# Patient Record
Sex: FEMALE | Race: WHITE | URBAN - METROPOLITAN AREA
[De-identification: names, ages, dates, MRNs, and addresses within clinical notes are randomized per-mention and may not be internally consistent; named-entity substitution may affect disease eponyms.]

---

## 2017-08-28 ENCOUNTER — EMERGENCY (EMERGENCY)
Facility: HOSPITAL | Age: 47
LOS: 0 days | Discharge: HOME | End: 2017-08-28

## 2017-08-28 DIAGNOSIS — Z90.710 ACQUIRED ABSENCE OF BOTH CERVIX AND UTERUS: ICD-10-CM

## 2017-08-28 DIAGNOSIS — Z79.899 OTHER LONG TERM (CURRENT) DRUG THERAPY: ICD-10-CM

## 2017-08-28 DIAGNOSIS — R10.11 RIGHT UPPER QUADRANT PAIN: ICD-10-CM

## 2017-08-28 DIAGNOSIS — R20.0 ANESTHESIA OF SKIN: ICD-10-CM

## 2017-08-28 DIAGNOSIS — R11.0 NAUSEA: ICD-10-CM

## 2017-08-28 DIAGNOSIS — Z87.891 PERSONAL HISTORY OF NICOTINE DEPENDENCE: ICD-10-CM

## 2017-10-22 ENCOUNTER — INPATIENT (INPATIENT)
Facility: HOSPITAL | Age: 47
LOS: 0 days | Discharge: HOME | End: 2017-10-23
Attending: INTERNAL MEDICINE

## 2017-10-22 DIAGNOSIS — R20.0 ANESTHESIA OF SKIN: ICD-10-CM

## 2017-10-27 DIAGNOSIS — F41.9 ANXIETY DISORDER, UNSPECIFIED: ICD-10-CM

## 2017-10-27 DIAGNOSIS — R20.0 ANESTHESIA OF SKIN: ICD-10-CM

## 2017-10-27 DIAGNOSIS — M50.220 OTHER CERVICAL DISC DISPLACEMENT, MID-CERVICAL REGION, UNSPECIFIED LEVEL: ICD-10-CM

## 2017-10-27 DIAGNOSIS — Z90.710 ACQUIRED ABSENCE OF BOTH CERVIX AND UTERUS: ICD-10-CM

## 2017-10-27 DIAGNOSIS — F17.200 NICOTINE DEPENDENCE, UNSPECIFIED, UNCOMPLICATED: ICD-10-CM

## 2020-07-13 ENCOUNTER — TRANSCRIPTION ENCOUNTER (OUTPATIENT)
Age: 50
End: 2020-07-13

## 2021-07-11 ENCOUNTER — EMERGENCY (EMERGENCY)
Facility: HOSPITAL | Age: 51
LOS: 0 days | Discharge: HOME | End: 2021-07-11
Attending: EMERGENCY MEDICINE | Admitting: EMERGENCY MEDICINE
Payer: MEDICAID

## 2021-07-11 VITALS
HEART RATE: 78 BPM | OXYGEN SATURATION: 100 % | RESPIRATION RATE: 18 BRPM | DIASTOLIC BLOOD PRESSURE: 88 MMHG | SYSTOLIC BLOOD PRESSURE: 142 MMHG

## 2021-07-11 VITALS
TEMPERATURE: 97 F | RESPIRATION RATE: 16 BRPM | HEART RATE: 87 BPM | WEIGHT: 136.03 LBS | OXYGEN SATURATION: 98 % | SYSTOLIC BLOOD PRESSURE: 153 MMHG | DIASTOLIC BLOOD PRESSURE: 99 MMHG

## 2021-07-11 DIAGNOSIS — R20.2 PARESTHESIA OF SKIN: ICD-10-CM

## 2021-07-11 DIAGNOSIS — M79.644 PAIN IN RIGHT FINGER(S): ICD-10-CM

## 2021-07-11 DIAGNOSIS — S60.940A UNSPECIFIED SUPERFICIAL INJURY OF RIGHT INDEX FINGER, INITIAL ENCOUNTER: ICD-10-CM
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
## 2021-07-11 DIAGNOSIS — M79.89 OTHER SPECIFIED SOFT TISSUE DISORDERS: ICD-10-CM

## 2021-07-11 PROCEDURE — 99284 EMERGENCY DEPT VISIT MOD MDM: CPT

## 2021-07-11 PROCEDURE — 73130 X-RAY EXAM OF HAND: CPT | Mod: 26,RT

## 2021-07-11 PROCEDURE — 73090 X-RAY EXAM OF FOREARM: CPT | Mod: 26,RT

## 2021-07-11 RX ORDER — IBUPROFEN 200 MG
600 TABLET ORAL ONCE
Refills: 0 | Status: COMPLETED | OUTPATIENT
Start: 2021-07-11 | End: 2021-07-11

## 2021-07-11 RX ADMIN — Medication 600 MILLIGRAM(S): at 11:07

## 2021-07-11 NOTE — ED PROVIDER NOTE - PATIENT PORTAL LINK FT
You can access the FollowMyHealth Patient Portal offered by [facility] by registering at the following website: [email or web address]. By joining [employer]’s FollowMyHealth portal, you will also be able to view your health information using other applications (apps) compatible with our system.

## 2021-07-11 NOTE — ED PROVIDER NOTE - NS ED ROS FT
Constitutional:  (-) fever, (-) chills, (-) lethargy  Eyes:  (-) eye pain (-) visual changes  ENMT: (-) nasal discharge, (-) sore throat. (-) neck pain or stiffness  Cardiac: (-) chest pain (-) palpitations  Respiratory:  (-) cough (-) respiratory distress.   GI:  (-) nausea (-) vomiting (-) diarrhea (-) abdominal pain.  :  (-) dysuria (-) frequency (-) burning.  MS:  (-) back pain (-) joint pain.  Neuro:  (-) headache (-) numbness (+) tingling, R finger (-) focal weakness  Skin:  (-) rash  Except as documented in the HPI,  all other systems are negative

## 2021-07-11 NOTE — ED PROVIDER NOTE - OBJECTIVE STATEMENT
52 yo F with no significant PMH presenting with finger injury. Patient jammed her R index finger on her own palm the night before causing mild discomfort. Patient says that since then, she has had progressively increased swelling in the finger. Endorses tingling to entire digit. Denies other injuries, headache, vision changes, dizziness, chest pain, shortness of breath, nausea, vomiting, diarrhea, dysuria.

## 2021-07-11 NOTE — ED PROVIDER NOTE - CLINICAL SUMMARY MEDICAL DECISION MAKING FREE TEXT BOX
patient presents for evaluation of right index finger pain and swelling after hitting her other hand in a forceful manner while talking. she notes the pain as throbbing in nature and swelling but no redness signs of infection, she has swelling but no redness no pain with passive extension finger is not held in a flexed position in addition no redness noted we obtained xrays and placed patient in a finger splint in extension advised to follow up with hand in am. in addition radial pulses 2 += cap refill is normal at this time patient presents for evaluation of right index finger pain and swelling after hitting her other hand in a forceful manner while talking. she notes the pain as throbbing in nature and swelling but no redness signs of infection, she has swelling but no redness no pain with passive extension finger is not held in a flexed position in addition no redness noted we obtained xrays and placed patient in a finger splint in extension advised to follow up with hand in am. in addition radial pulses 2 += cap refill is normal at this time.

## 2021-07-11 NOTE — ED PROVIDER NOTE - NSFOLLOWUPINSTRUCTIONS_ED_ALL_ED_FT
Jammed Finger    A jammed finger is an injury to the ligaments that support your finger bones. Ligaments are strong bands of tissue that connect bones and keep them in place. This injury happens when the ligaments are stretched beyond their normal range of motion (sprained).    CAUSES  A jammed finger is caused by a hard direct hit to the tip of your finger that pushes your finger toward your hand.     RISK FACTORS  This injury is more likely to happen if you play sports.    SYMPTOMS  Symptoms of a jammed finger include:    Pain.  Swelling.  Discoloration and bruising around the joint.  Difficulty bending or straightening the finger.  Not being able to use the finger normally.    DIAGNOSIS  A jammed finger is diagnosed with a medical history and physical exam. You may also have X-rays taken to check for a broken bone (fracture).     TREATMENT  Treatment for a jammed finger may include:    Wearing a splint.  Taping the injured finger to the fingers beside it (chelita taping).   Medicines used to treat pain.    Depending on the type of injury, you may have to do exercises after your finger has begun to heal. This helps you regain strength and mobility in the finger.     HOME CARE INSTRUCTIONS  Take medicines only as directed by your health care provider.   Apply ice to the injured area:    Put ice in a plastic bag.    Place a towel between your skin and the bag.    Leave the ice on for 20 minutes, 2–3 times per day.   Raise the injured area above the level of your heart while you are sitting or lying down.  Wear the splint or tape as directed by your health care provider. Remove it only as directed by your health care provider.   Rest your finger until your health care provider says you can move it again. Your finger may feel stiff and painful for a while.  Perform strengthening exercises as directed by your health care provider. It may help to start doing these exercises with your hand in a bowl of warm water.  Keep all follow-up visits as directed by your health care provider. This is important.    SEEK MEDICAL CARE IF:  You have pain or swelling that is getting worse.  Your finger feels cold.  Your finger looks out of place at the joint (deformity).  You still cannot extend your finger after treatment.  You have a fever.    SEEK IMMEDIATE MEDICAL CARE IF:  Even after loosening your splint, your finger:  Is very red and swollen.  Is white or blue.  Feels tingly or becomes numb.    ADDITIONAL NOTES AND INSTRUCTIONS    Please follow up with your Primary MD in 24-48 hr.  Seek immediate medical care for any new/worsening signs or symptoms.

## 2021-07-11 NOTE — ED PROVIDER NOTE - PHYSICAL EXAMINATION
CONSTITUTIONAL: well-appearing, in NAD  SKIN: Warm dry, normal skin turgor  HEAD: NCAT  EYES: EOMI, PERRLA, no scleral icterus, conjunctiva pink  ENT: normal pharynx with no erythema or exudates  NECK: Supple; non tender. Full ROM.  CARD: RRR, no murmurs.  RESP: clear to ausculation b/l. No crackles or wheezing.  ABD: soft, non-tender, non-distended, no rebound or guarding.  EXT: R index finger grossly swollen, sensation in tact, cap refill <2 seconds, decreased active ROM 2/2 swelling and pain, full passive ROM  Full ROM, no bony tenderness, no pedal edema, no calf tenderness  NEURO: normal motor. normal sensory. Normal gait.  PSYCH: Cooperative, appropriate.

## 2021-07-11 NOTE — ED PROVIDER NOTE - ATTENDING CONTRIBUTION TO CARE
I was present for and supervised the key and critical aspects of the procedures performed during the care of the patient. patient presents for evaluation of right index finger pain and swelling after hitting her other hand in a forceful manner while talking. she notes the pain as throbbing in nature and swelling but no redness signs of infection, she has swelling but no redness no pain with passive extension finger is not held in a flexed position in addition no redness noted we obtained xrays and placed patient in a finger splint in extension advised to follow up with hand in am. in addition radial pulses 2 += cap refill is normal at this time

## 2021-07-11 NOTE — ED PROVIDER NOTE - CARE PROVIDER_API CALL
Isac Simpson)  Orthopaedic Surgery  [address] [place] [address]  Phone: [phone]  Fax: [phone]  Follow Up Time: 1-3 Days

## 2021-11-01 ENCOUNTER — INPATIENT (INPATIENT)
Facility: HOSPITAL | Age: 51
LOS: 2 days | Discharge: HOME | End: 2021-11-04
Attending: INTERNAL MEDICINE | Admitting: INTERNAL MEDICINE
Payer: MEDICAID

## 2021-11-01 VITALS
HEART RATE: 84 BPM | OXYGEN SATURATION: 97 % | DIASTOLIC BLOOD PRESSURE: 105 MMHG | RESPIRATION RATE: 19 BRPM | TEMPERATURE: 98 F | WEIGHT: 134.92 LBS | SYSTOLIC BLOOD PRESSURE: 206 MMHG

## 2021-11-01 LAB
ALBUMIN SERPL ELPH-MCNC: 4 G/DL — SIGNIFICANT CHANGE UP (ref 3.5–5.2)
ALBUMIN SERPL ELPH-MCNC: 4.2 G/DL — SIGNIFICANT CHANGE UP (ref 3.5–5.2)
ALP SERPL-CCNC: 74 U/L — SIGNIFICANT CHANGE UP (ref 30–115)
ALP SERPL-CCNC: 79 U/L — SIGNIFICANT CHANGE UP (ref 30–115)
ALT FLD-CCNC: 15 U/L — SIGNIFICANT CHANGE UP (ref 0–41)
ALT FLD-CCNC: 19 U/L — SIGNIFICANT CHANGE UP (ref 0–41)
ANION GAP SERPL CALC-SCNC: 12 MMOL/L — SIGNIFICANT CHANGE UP (ref 7–14)
ANION GAP SERPL CALC-SCNC: 9 MMOL/L — SIGNIFICANT CHANGE UP (ref 7–14)
AST SERPL-CCNC: 13 U/L — SIGNIFICANT CHANGE UP (ref 0–41)
AST SERPL-CCNC: 32 U/L — SIGNIFICANT CHANGE UP (ref 0–41)
BASOPHILS # BLD AUTO: 0.03 K/UL — SIGNIFICANT CHANGE UP (ref 0–0.2)
BASOPHILS NFR BLD AUTO: 0.3 % — SIGNIFICANT CHANGE UP (ref 0–1)
BILIRUB SERPL-MCNC: 0.3 MG/DL — SIGNIFICANT CHANGE UP (ref 0.2–1.2)
BILIRUB SERPL-MCNC: <0.2 MG/DL — SIGNIFICANT CHANGE UP (ref 0.2–1.2)
BUN SERPL-MCNC: 10 MG/DL — SIGNIFICANT CHANGE UP (ref 10–20)
BUN SERPL-MCNC: 11 MG/DL — SIGNIFICANT CHANGE UP (ref 10–20)
CALCIUM SERPL-MCNC: 8.9 MG/DL — SIGNIFICANT CHANGE UP (ref 8.5–10.1)
CALCIUM SERPL-MCNC: 9.3 MG/DL — SIGNIFICANT CHANGE UP (ref 8.5–10.1)
CHLORIDE SERPL-SCNC: 106 MMOL/L — SIGNIFICANT CHANGE UP (ref 98–110)
CHLORIDE SERPL-SCNC: 98 MMOL/L — SIGNIFICANT CHANGE UP (ref 98–110)
CO2 SERPL-SCNC: 20 MMOL/L — SIGNIFICANT CHANGE UP (ref 17–32)
CO2 SERPL-SCNC: 23 MMOL/L — SIGNIFICANT CHANGE UP (ref 17–32)
CREAT SERPL-MCNC: 0.7 MG/DL — SIGNIFICANT CHANGE UP (ref 0.7–1.5)
CREAT SERPL-MCNC: 0.8 MG/DL — SIGNIFICANT CHANGE UP (ref 0.7–1.5)
EOSINOPHIL # BLD AUTO: 0.16 K/UL — SIGNIFICANT CHANGE UP (ref 0–0.7)
EOSINOPHIL NFR BLD AUTO: 1.7 % — SIGNIFICANT CHANGE UP (ref 0–8)
GLUCOSE SERPL-MCNC: 92 MG/DL — SIGNIFICANT CHANGE UP (ref 70–99)
GLUCOSE SERPL-MCNC: 93 MG/DL — SIGNIFICANT CHANGE UP (ref 70–99)
HCT VFR BLD CALC: 43.3 % — SIGNIFICANT CHANGE UP (ref 37–47)
HGB BLD-MCNC: 14.3 G/DL — SIGNIFICANT CHANGE UP (ref 12–16)
IMM GRANULOCYTES NFR BLD AUTO: 0.3 % — SIGNIFICANT CHANGE UP (ref 0.1–0.3)
LACTATE SERPL-SCNC: 0.7 MMOL/L — SIGNIFICANT CHANGE UP (ref 0.7–2)
LYMPHOCYTES # BLD AUTO: 4.34 K/UL — HIGH (ref 1.2–3.4)
LYMPHOCYTES # BLD AUTO: 45.5 % — SIGNIFICANT CHANGE UP (ref 20.5–51.1)
MAGNESIUM SERPL-MCNC: 2.2 MG/DL — SIGNIFICANT CHANGE UP (ref 1.8–2.4)
MCHC RBC-ENTMCNC: 32.6 PG — HIGH (ref 27–31)
MCHC RBC-ENTMCNC: 33 G/DL — SIGNIFICANT CHANGE UP (ref 32–37)
MCV RBC AUTO: 98.6 FL — SIGNIFICANT CHANGE UP (ref 81–99)
MONOCYTES # BLD AUTO: 0.81 K/UL — HIGH (ref 0.1–0.6)
MONOCYTES NFR BLD AUTO: 8.5 % — SIGNIFICANT CHANGE UP (ref 1.7–9.3)
NEUTROPHILS # BLD AUTO: 4.17 K/UL — SIGNIFICANT CHANGE UP (ref 1.4–6.5)
NEUTROPHILS NFR BLD AUTO: 43.7 % — SIGNIFICANT CHANGE UP (ref 42.2–75.2)
NEUTS BAND # BLD: 1 % — SIGNIFICANT CHANGE UP (ref 0–6)
NRBC # BLD: 0 /100 WBCS — SIGNIFICANT CHANGE UP (ref 0–0)
NRBC # BLD: 0 /100 — SIGNIFICANT CHANGE UP (ref 0–0)
PLAT MORPH BLD: NORMAL — SIGNIFICANT CHANGE UP
PLATELET # BLD AUTO: 318 K/UL — SIGNIFICANT CHANGE UP (ref 130–400)
POTASSIUM SERPL-MCNC: 4.1 MMOL/L — SIGNIFICANT CHANGE UP (ref 3.5–5)
POTASSIUM SERPL-MCNC: 6.4 MMOL/L — CRITICAL HIGH (ref 3.5–5)
POTASSIUM SERPL-SCNC: 4.1 MMOL/L — SIGNIFICANT CHANGE UP (ref 3.5–5)
POTASSIUM SERPL-SCNC: 6.4 MMOL/L — CRITICAL HIGH (ref 3.5–5)
PROT SERPL-MCNC: 6 G/DL — SIGNIFICANT CHANGE UP (ref 6–8)
PROT SERPL-MCNC: 6.9 G/DL — SIGNIFICANT CHANGE UP (ref 6–8)
RBC # BLD: 4.39 M/UL — SIGNIFICANT CHANGE UP (ref 4.2–5.4)
RBC # FLD: 12.6 % — SIGNIFICANT CHANGE UP (ref 11.5–14.5)
RBC BLD AUTO: NORMAL — SIGNIFICANT CHANGE UP
SARS-COV-2 RNA SPEC QL NAA+PROBE: SIGNIFICANT CHANGE UP
SODIUM SERPL-SCNC: 130 MMOL/L — LOW (ref 135–146)
SODIUM SERPL-SCNC: 138 MMOL/L — SIGNIFICANT CHANGE UP (ref 135–146)
TROPONIN T SERPL-MCNC: <0.01 NG/ML — SIGNIFICANT CHANGE UP
VARIANT LYMPHS # BLD: 7 % — HIGH (ref 0–5)
WBC # BLD: 9.54 K/UL — SIGNIFICANT CHANGE UP (ref 4.8–10.8)
WBC # FLD AUTO: 9.54 K/UL — SIGNIFICANT CHANGE UP (ref 4.8–10.8)

## 2021-11-01 PROCEDURE — 93010 ELECTROCARDIOGRAM REPORT: CPT

## 2021-11-01 PROCEDURE — 99221 1ST HOSP IP/OBS SF/LOW 40: CPT

## 2021-11-01 PROCEDURE — 71045 X-RAY EXAM CHEST 1 VIEW: CPT | Mod: 26

## 2021-11-01 PROCEDURE — 99285 EMERGENCY DEPT VISIT HI MDM: CPT

## 2021-11-01 PROCEDURE — 70498 CT ANGIOGRAPHY NECK: CPT | Mod: 26,MA

## 2021-11-01 PROCEDURE — 70496 CT ANGIOGRAPHY HEAD: CPT | Mod: 26,MA

## 2021-11-01 PROCEDURE — 93970 EXTREMITY STUDY: CPT | Mod: 26

## 2021-11-01 RX ORDER — ATORVASTATIN CALCIUM 80 MG/1
80 TABLET, FILM COATED ORAL ONCE
Refills: 0 | Status: COMPLETED | OUTPATIENT
Start: 2021-11-01 | End: 2021-11-01

## 2021-11-01 RX ORDER — ASPIRIN/CALCIUM CARB/MAGNESIUM 324 MG
325 TABLET ORAL ONCE
Refills: 0 | Status: COMPLETED | OUTPATIENT
Start: 2021-11-01 | End: 2021-11-01

## 2021-11-01 RX ORDER — ALPRAZOLAM 0.25 MG
2 TABLET ORAL AT BEDTIME
Refills: 0 | Status: DISCONTINUED | OUTPATIENT
Start: 2021-11-01 | End: 2021-11-04

## 2021-11-01 RX ADMIN — Medication 325 MILLIGRAM(S): at 22:57

## 2021-11-01 RX ADMIN — ATORVASTATIN CALCIUM 80 MILLIGRAM(S): 80 TABLET, FILM COATED ORAL at 22:58

## 2021-11-01 NOTE — ED ADULT NURSE NOTE - CHIEF COMPLAINT QUOTE
pt states " I had numbness from my left arm to my leg that started last night. I went to [facility] and they told me to come to ED to R/O blood clot " MD at bedside

## 2021-11-01 NOTE — H&P ADULT - ASSESSMENT
51 YEAR OLD FEMALE ADMIT TO THE MEDICAL FLOOR FOR PARESTHESIA .  PLAN PARESTHESIA START LIPITOR 80MG DAILY AND ASPIRIN 325 MG DAILY AS  PER DR ADÁN BEATTY REQUEST             NEUROLOGY CONSULT LISETTE MCCAIN MD             NEURO CHECK Q SHIFT            ANXIETY CONTINUE XANAX PO PRN

## 2021-11-01 NOTE — H&P ADULT - NSHPLABSRESULTS_GEN_ALL_CORE
14.3   9.54  )-----------( 318      ( 01 Nov 2021 20:24 )             43.3       11-01    130<L>  |  98  |  10  ----------------------------<  92  4.1   |  20  |  0.7    Ca    8.9      01 Nov 2021 21:20  Mg     2.2     11-01    TPro  6.0  /  Alb  4.0  /  TBili  0.3  /  DBili  x   /  AST  13  /  ALT  15  /  AlkPhos  79  11-01                      Lactate Trend  11-01 @ 20:24 Lactate:0.7       CARDIAC MARKERS ( 01 Nov 2021 20:24 )  x     / <0.01 ng/mL / x     / x     / x            CAPILLARY BLOOD GLUCOSE 14.3   9.54  )-----------( 318      ( 01 Nov 2021 20:24 )             43.3       11-01    130<L>  |  98  |  10  ----------------------------<  92  4.1   |  20  |  0.7    Ca    8.9      01 Nov 2021 21:20  Mg     2.2     11-01    TPro  6.0  /  Alb  4.0  /  TBili  0.3  /  DBili  x   /  AST  13  /  ALT  15  /  AlkPhos  79  11-01        C< from: CT Angio Head w/ IV Cont (11.01.21 @ 21:52) >      IMPRESSION:    Negative CTA of the head and neck  No evidence of major vascular stenosis, occlusion, or aneurysm.    < from: CT Head No Cont (11.01.21 @ 21:49) >    IMPRESSION:    No CT evidence for acute intracranialpathology.    < end of copied text >            Lactate Trend  11-01 @ 20:24 Lactate:0.7       CARDIAC MARKERS ( 01 Nov 2021 20:24 )  x     / <0.01 ng/mL / x     / x     / x            CAPILLARY BLOOD GLUCOSE

## 2021-11-01 NOTE — ED ADULT TRIAGE NOTE - CHIEF COMPLAINT QUOTE
pt states " I had numbness from my left arm to my leg that started last night. I went to [facility] and they told me to come to ED to R/O blood clot " MD at bedside

## 2021-11-01 NOTE — H&P ADULT - ATTENDING COMMENTS
Case reviewed in detail with medical PA Patient seen at bedside though resting on my arrival. Case reviewed in detail with medical PA. Agree with assessment and plan (as discussed between ER staff and on call neurologist)

## 2021-11-01 NOTE — ED PROVIDER NOTE - PHYSICAL EXAMINATION
Physical Exam    Vital Signs: I have reviewed the initial vital signs.  Constitutional: well-nourished, appears stated age, no acute distress  Eyes: Conjunctiva pink, Sclera clear, PERRLA, EOMI.  Cardiovascular: S1 and S2, regular rate, regular rhythm, well-perfused extremities, radial pulses equal and 2+  Respiratory: unlabored respiratory effort, clear to auscultation bilaterally no wheezing, rales and rhonchi  Gastrointestinal: soft, non-tender abdomen, no pulsatile mass, normal bowl sounds  Musculoskeletal: supple neck, no lower extremity edema, no midline tenderness  Integumentary: warm, dry, no rash  Neurologic: awake, alert, cranial nerves II-XII grossly intact, extremities’ motor functions grossly intact, left sided arm and leg decrease sensation. cerebellar intact.   Psychiatric: appropriate mood, appropriate affect

## 2021-11-01 NOTE — ED PROVIDER NOTE - NSICDXFAMILYHX_GEN_ALL_CORE_FT
FAMILY HISTORY:  FH: kidney cancer    Mother  Still living? No  Family history of TIAs, Age at diagnosis: Age Unknown  FH: HTN (hypertension), Age at diagnosis: Age Unknown

## 2021-11-01 NOTE — ED PROVIDER NOTE - OBJECTIVE STATEMENT
51 year old female no sig past medical history comes to emergency room for left sided numbness that started yesterday around 7pm. patient states that she was eating dinner and started to have left sided numbness which has been constant but got worse tonight at 5pm. patient states that she took her bp was elevated and came to emergency room. patient denies chest pain, shortness of breath, abd pain, nausea, vomiting, diarrhea.

## 2021-11-01 NOTE — H&P ADULT - NSICDXNOPASTMEDICALHX_GEN_ALL_CORE
06/10/17 1600   Group 4   Start Time 1415   Stop Time 1515   Length (min) 60 min   Group Name Task Skills   Focus of Group social/leisure skills   Attendance Not present      <-- Click to add NO pertinent Past Medical History

## 2021-11-01 NOTE — ED PROVIDER NOTE - CLINICAL SUMMARY MEDICAL DECISION MAKING FREE TEXT BOX
51yF no pmhx no meds follows with her  PMD dr zamora  -  pw  tingling  and  numbness to left  side  arm and leg  started about 25.5 hours ago  -  admits a round 7 pm  last night  after  dinner -  symptoms  remained there until  tonight and  felt they got worse  around 5pm -  never resolved -   took her BP at t hat  time and it was elevated - went to [facility] and  sent to ED.   no  slurred speech headache  no difficulty ambulating  or holding things,    Alert nontoxic, perrl eomi, no pallor   CVS RRR, Resp CTA no tachypnea no hyperpnea,  No le edema, no skin lesions  Alert and oriented.  CN 2-12 intact.  Motor strength  symmetric 5/5 strength,  decreased sensation  to LUE.   CB intact. no dysmetria  normal gait.   CT head  and CTA  no acute findings,   dw neuro -  admitted to floor  given  length of time of  symptoms,  recommended asa and  statin -  given in ED     pt admitted

## 2021-11-02 LAB
A1C WITH ESTIMATED AVERAGE GLUCOSE RESULT: 5.3 % — SIGNIFICANT CHANGE UP (ref 4–5.6)
ANION GAP SERPL CALC-SCNC: 7 MMOL/L — SIGNIFICANT CHANGE UP (ref 7–14)
BUN SERPL-MCNC: 8 MG/DL — LOW (ref 10–20)
CALCIUM SERPL-MCNC: 9.1 MG/DL — SIGNIFICANT CHANGE UP (ref 8.5–10.1)
CHLORIDE SERPL-SCNC: 107 MMOL/L — SIGNIFICANT CHANGE UP (ref 98–110)
CHOLEST SERPL-MCNC: 221 MG/DL — HIGH
CO2 SERPL-SCNC: 27 MMOL/L — SIGNIFICANT CHANGE UP (ref 17–32)
CREAT SERPL-MCNC: 0.8 MG/DL — SIGNIFICANT CHANGE UP (ref 0.7–1.5)
ESTIMATED AVERAGE GLUCOSE: 105 MG/DL — SIGNIFICANT CHANGE UP (ref 68–114)
GLUCOSE SERPL-MCNC: 83 MG/DL — SIGNIFICANT CHANGE UP (ref 70–99)
HCT VFR BLD CALC: 40.8 % — SIGNIFICANT CHANGE UP (ref 37–47)
HDLC SERPL-MCNC: 91 MG/DL — SIGNIFICANT CHANGE UP
HGB BLD-MCNC: 13.7 G/DL — SIGNIFICANT CHANGE UP (ref 12–16)
LIPID PNL WITH DIRECT LDL SERPL: 117 MG/DL — HIGH
MCHC RBC-ENTMCNC: 33 PG — HIGH (ref 27–31)
MCHC RBC-ENTMCNC: 33.6 G/DL — SIGNIFICANT CHANGE UP (ref 32–37)
MCV RBC AUTO: 98.3 FL — SIGNIFICANT CHANGE UP (ref 81–99)
NON HDL CHOLESTEROL: 130 MG/DL — HIGH
NRBC # BLD: 0 /100 WBCS — SIGNIFICANT CHANGE UP (ref 0–0)
PLATELET # BLD AUTO: 299 K/UL — SIGNIFICANT CHANGE UP (ref 130–400)
POTASSIUM SERPL-MCNC: 4.2 MMOL/L — SIGNIFICANT CHANGE UP (ref 3.5–5)
POTASSIUM SERPL-SCNC: 4.2 MMOL/L — SIGNIFICANT CHANGE UP (ref 3.5–5)
RBC # BLD: 4.15 M/UL — LOW (ref 4.2–5.4)
RBC # FLD: 12.9 % — SIGNIFICANT CHANGE UP (ref 11.5–14.5)
SODIUM SERPL-SCNC: 141 MMOL/L — SIGNIFICANT CHANGE UP (ref 135–146)
TRIGL SERPL-MCNC: 71 MG/DL — SIGNIFICANT CHANGE UP
TSH SERPL-MCNC: 1.28 UIU/ML — SIGNIFICANT CHANGE UP (ref 0.27–4.2)
WBC # BLD: 8.01 K/UL — SIGNIFICANT CHANGE UP (ref 4.8–10.8)
WBC # FLD AUTO: 8.01 K/UL — SIGNIFICANT CHANGE UP (ref 4.8–10.8)

## 2021-11-02 PROCEDURE — 99232 SBSQ HOSP IP/OBS MODERATE 35: CPT

## 2021-11-02 PROCEDURE — 93010 ELECTROCARDIOGRAM REPORT: CPT

## 2021-11-02 PROCEDURE — 99222 1ST HOSP IP/OBS MODERATE 55: CPT

## 2021-11-02 RX ORDER — CHLORHEXIDINE GLUCONATE 213 G/1000ML
1 SOLUTION TOPICAL DAILY
Refills: 0 | Status: DISCONTINUED | OUTPATIENT
Start: 2021-11-02 | End: 2021-11-04

## 2021-11-02 RX ORDER — ENOXAPARIN SODIUM 100 MG/ML
40 INJECTION SUBCUTANEOUS DAILY
Refills: 0 | Status: DISCONTINUED | OUTPATIENT
Start: 2021-11-02 | End: 2021-11-04

## 2021-11-02 RX ORDER — ATORVASTATIN CALCIUM 80 MG/1
80 TABLET, FILM COATED ORAL AT BEDTIME
Refills: 0 | Status: DISCONTINUED | OUTPATIENT
Start: 2021-11-02 | End: 2021-11-04

## 2021-11-02 RX ORDER — ASPIRIN/CALCIUM CARB/MAGNESIUM 324 MG
81 TABLET ORAL DAILY
Refills: 0 | Status: DISCONTINUED | OUTPATIENT
Start: 2021-11-02 | End: 2021-11-04

## 2021-11-02 RX ADMIN — CHLORHEXIDINE GLUCONATE 1 APPLICATION(S): 213 SOLUTION TOPICAL at 12:30

## 2021-11-02 RX ADMIN — ENOXAPARIN SODIUM 40 MILLIGRAM(S): 100 INJECTION SUBCUTANEOUS at 12:29

## 2021-11-02 RX ADMIN — Medication 2 MILLIGRAM(S): at 23:59

## 2021-11-02 RX ADMIN — Medication 2 MILLIGRAM(S): at 21:10

## 2021-11-02 NOTE — CONSULT NOTE ADULT - ATTENDING COMMENTS
Patient examined.  Smoking cessation discussed.  MRI to exclude acute stroke.  Antiplatelet and statin recommended.

## 2021-11-02 NOTE — CONSULT NOTE ADULT - SUBJECTIVE AND OBJECTIVE BOX
Neurology Consult  MARTA Jo [id number]    Patient is a 51y old  Female who presents with a chief complaint of 51 YEAR OLD FEMALE C/O NUMBNESS IN HER LEFT LE . (01 Nov 2021 23:26)      HPI:  51 YEAR OLD FEMALE WITH PMH OF ANXIETY COME TO THE ER C/O LEFT LOWER EXTREMITY START EARLY TONIGHT . PATIENT STATES THE THE  SYMPTOMS RESOLVED NOW . PATIENT STATES THAT SHE HAS PREVIOUS EPISODE LIKE THAT IN THE PAST AND HER BLOOD PRESSURE WAS HIGH THAT TIME AND RESOLVED ALSO . PATIENT DENIES ANY OTHER SYMPTOMS IN HER BODY .    (01 Nov 2021 23:26)      Interim HPI -  Pt seen at bedside with Dr Ladd, patient admits to above hpi, and states never had similar episode. Pt does admit to hx of anxiety and states she has no elevated anxiety over the past week and no recent concerns of anxiety. Pt does have family hx of TIA and stroke.  Pt denies any current visual complaints, denies h/a, denies migraines.  Pt admits not 100% back to baseline.  ++Smoker 1ppd hx     PAST MEDICAL & SURGICAL HISTORY:  No pertinent past medical history  ANXIETY        FAMILY HISTORY:  FH: kidney cancer    FH: HTN (hypertension) (Mother)    Family history of TIAs (Mother)        Allergies    No Known Allergies    Intolerances        MEDICATIONS  (STANDING):  aspirin enteric coated 81 milliGRAM(s) Oral daily  atorvastatin 80 milliGRAM(s) Oral at bedtime  chlorhexidine 4% Liquid 1 Application(s) Topical daily  enoxaparin Injectable 40 milliGRAM(s) SubCutaneous daily    MEDICATIONS  (PRN):  ALPRAZolam 2 milliGRAM(s) Oral at bedtime PRN ANXIETY      Review of systems:    Constitutional: No fever, weight loss or fatigue    Eyes: No eye pain or discharge  ENMT:  No difficulty hearing; No sinus or throat pain  Neck: No pain or stiffness  Respiratory: No cough, wheezing, chills or hemoptysis  Cardiovascular: No chest pain, palpitations, shortness of breath, dyspnea on exertion  Gastrointestinal: No abdominal pain, nausea, vomiting or hematemesis; No diarrhea or constipation.   Genitourinary: No dysuria, frequency, hematuria or incontinence  Neurological: As per HPI  Skin: No rashes or lesions   Endocrine: No heat or cold intolerance; No hair loss  Musculoskeletal: No joint pain or swelling  Psychiatric: No depression, anxiety, mood swings  Heme/Lymph: No easy bruising or bleeding gums    Vital Signs Last 24 Hrs  T(C): 36.1 (02 Nov 2021 14:09), Max: 36.7 (02 Nov 2021 09:33)  T(F): 96.9 (02 Nov 2021 14:09), Max: 98 (02 Nov 2021 09:33)  HR: 75 (02 Nov 2021 14:09) (66 - 84)  BP: 125/88 (02 Nov 2021 14:09) (116/78 - 206/105)  BP(mean): --  RR: 18 (02 Nov 2021 14:09) (18 - 19)  SpO2: 97% (02 Nov 2021 14:09) (96% - 97%)    Neurologic Examination:  General:  Appearance is consistent with chronologic age.  No abnormal facies.   General: The patient is oriented to person, place, time and date.  Recent and remote memory intact.  Fund of knowledge is intact and normal.  Language with normal repetition, comprehension and naming.  Nondysarthric.    Cranial nerves: intact VA, VFF.  EOMI w/o nystagmus, skew or reported double vision.  PERRL.  No ptosis/weakness of eyelid closure.  Facial sensation is normal with normal bite.  No facial asymmetry.  Hearing grossly intact b/l.  Palate elevates midline.  Tongue midline.  Motor examination:   Normal tone, bulk and range of motion.  No tenderness, twitching, tremors or involuntary movements.  Formal Muscle Strength Testing: (MRC grade R/L) 5/5 UE; 5/5 LE.  No observable drift.  Reflexes:   2+ b/l pectoralis, biceps, triceps, brachioradialis, patella and Achilles.  Plantar response downgoing b/l.  Jaw jerk, Jimmy, clonus absent.  Sensory examination:   Intact to light touch and pinprick, pain, temperature and proprioception and vibration in all extremities.  Cerebellum:   FTN/HKS intact with normal AGATHA in all limbs.  No dysmetria or dysdiadokinesia.  Gait narrow based and normal.  NIHSS- 0      Labs:   CBC Full  -  ( 02 Nov 2021 05:46 )  WBC Count : 8.01 K/uL  RBC Count : 4.15 M/uL  Hemoglobin : 13.7 g/dL  Hematocrit : 40.8 %  Platelet Count - Automated : 299 K/uL  Mean Cell Volume : 98.3 fL  Mean Cell Hemoglobin : 33.0 pg  Mean Cell Hemoglobin Concentration : 33.6 g/dL  Auto Neutrophil # : x      11-02    141  |  107  |  8<L>  ----------------------------<  83  4.2   |  27  |  0.8    Ca    9.1      02 Nov 2021 05:46  Mg     2.2     11-01    TPro  6.0  /  Alb  4.0  /  TBili  0.3  /  DBili  x   /  AST  13  /  ALT  15  /  AlkPhos  79  11-01    LIVER FUNCTIONS - ( 01 Nov 2021 21:20 )  Alb: 4.0 g/dL / Pro: 6.0 g/dL / ALK PHOS: 79 U/L / ALT: 15 U/L / AST: 13 U/L / GGT: x                   Neuroimaging:  NCHCT: CT Head No Cont:   EXAM:  CT BRAIN            PROCEDURE DATE:  11/01/2021            INTERPRETATION:  Clinical History / Reason for exam: Left-sided weakness    TECHNIQUE: Contiguous axial CT images were obtained from the base of the skull to the vertex without administration of intravenous contrast. Coronal and sagittal reformatted images were constructed.    COMPARISON:CT head 10/22/2017      FINDINGS:    The ventricles and cortical sulci are appropriate for the patient's stated age.    Gray-white matter differentiation is preserved.    There is no evidence for intracranial hemorrhage, mass effect or midline shift    The calvarium is intact. Visualized paranasal sinuses and mastoids are well-aerated.      IMPRESSION:    No CT evidence for acute intracranialpathology.    --- End of Report ---            MYRON GONZALEZ MD; Resident Radiologist  This document has been electronically signed.  SAMI CROWDER MD; Attending Interventional Radiologist  This document has been electronically signed. Nov 1 202110:05PM (11-01-21 @ 21:49)    CT Angiography/Perfusion:  MRI Brain NC:  MRA Head/Neck:  EEG:    Assessment:  This is a 51y Female with h/o anxiety, presents to hospital for 1 day episode of left sided numbness.  Anxiety exacerbation vs Stroke vs TIA    Plan:   - Telemetry monitoring  - CTH noted above no acute pathology  - MRI Brain no contrast needed  - Continue ASA 81mg daily  - Continue Atorvastatin 80mg daily while inpatient  - Neuro Checks q8h   - Stroke workup  - Lipid profile, HgA1C  - Smoking Cessation  - Neurology team will follow      11-02-21 @ 14:16

## 2021-11-03 LAB
ALBUMIN SERPL ELPH-MCNC: 3.8 G/DL — SIGNIFICANT CHANGE UP (ref 3.5–5.2)
ALP SERPL-CCNC: 71 U/L — SIGNIFICANT CHANGE UP (ref 30–115)
ALT FLD-CCNC: 13 U/L — SIGNIFICANT CHANGE UP (ref 0–41)
ANION GAP SERPL CALC-SCNC: 10 MMOL/L — SIGNIFICANT CHANGE UP (ref 7–14)
AST SERPL-CCNC: 13 U/L — SIGNIFICANT CHANGE UP (ref 0–41)
BILIRUB SERPL-MCNC: 0.3 MG/DL — SIGNIFICANT CHANGE UP (ref 0.2–1.2)
BUN SERPL-MCNC: 14 MG/DL — SIGNIFICANT CHANGE UP (ref 10–20)
CALCIUM SERPL-MCNC: 9.2 MG/DL — SIGNIFICANT CHANGE UP (ref 8.5–10.1)
CHLORIDE SERPL-SCNC: 106 MMOL/L — SIGNIFICANT CHANGE UP (ref 98–110)
CO2 SERPL-SCNC: 24 MMOL/L — SIGNIFICANT CHANGE UP (ref 17–32)
COVID-19 SPIKE DOMAIN AB INTERP: POSITIVE
COVID-19 SPIKE DOMAIN ANTIBODY RESULT: >250 U/ML — HIGH
CREAT SERPL-MCNC: 0.8 MG/DL — SIGNIFICANT CHANGE UP (ref 0.7–1.5)
GLUCOSE SERPL-MCNC: 84 MG/DL — SIGNIFICANT CHANGE UP (ref 70–99)
HCT VFR BLD CALC: 42.2 % — SIGNIFICANT CHANGE UP (ref 37–47)
HGB BLD-MCNC: 13.9 G/DL — SIGNIFICANT CHANGE UP (ref 12–16)
MCHC RBC-ENTMCNC: 32.9 G/DL — SIGNIFICANT CHANGE UP (ref 32–37)
MCHC RBC-ENTMCNC: 32.9 PG — HIGH (ref 27–31)
MCV RBC AUTO: 100 FL — HIGH (ref 81–99)
NRBC # BLD: 0 /100 WBCS — SIGNIFICANT CHANGE UP (ref 0–0)
PLATELET # BLD AUTO: 307 K/UL — SIGNIFICANT CHANGE UP (ref 130–400)
POTASSIUM SERPL-MCNC: 5.5 MMOL/L — HIGH (ref 3.5–5)
POTASSIUM SERPL-SCNC: 5.5 MMOL/L — HIGH (ref 3.5–5)
PROT SERPL-MCNC: 5.9 G/DL — LOW (ref 6–8)
RBC # BLD: 4.22 M/UL — SIGNIFICANT CHANGE UP (ref 4.2–5.4)
RBC # FLD: 12.7 % — SIGNIFICANT CHANGE UP (ref 11.5–14.5)
SARS-COV-2 IGG+IGM SERPL QL IA: >250 U/ML — HIGH
SARS-COV-2 IGG+IGM SERPL QL IA: POSITIVE
SODIUM SERPL-SCNC: 140 MMOL/L — SIGNIFICANT CHANGE UP (ref 135–146)
WBC # BLD: 7.02 K/UL — SIGNIFICANT CHANGE UP (ref 4.8–10.8)
WBC # FLD AUTO: 7.02 K/UL — SIGNIFICANT CHANGE UP (ref 4.8–10.8)

## 2021-11-03 PROCEDURE — 99232 SBSQ HOSP IP/OBS MODERATE 35: CPT

## 2021-11-03 RX ADMIN — ATORVASTATIN CALCIUM 80 MILLIGRAM(S): 80 TABLET, FILM COATED ORAL at 00:01

## 2021-11-03 RX ADMIN — Medication 81 MILLIGRAM(S): at 11:13

## 2021-11-03 RX ADMIN — Medication 2 MILLIGRAM(S): at 21:29

## 2021-11-03 RX ADMIN — ATORVASTATIN CALCIUM 80 MILLIGRAM(S): 80 TABLET, FILM COATED ORAL at 21:24

## 2021-11-03 RX ADMIN — Medication 1 MILLIGRAM(S): at 22:56

## 2021-11-03 RX ADMIN — ENOXAPARIN SODIUM 40 MILLIGRAM(S): 100 INJECTION SUBCUTANEOUS at 11:13

## 2021-11-04 ENCOUNTER — TRANSCRIPTION ENCOUNTER (OUTPATIENT)
Age: 51
End: 2021-11-04

## 2021-11-04 VITALS
HEART RATE: 62 BPM | TEMPERATURE: 97 F | DIASTOLIC BLOOD PRESSURE: 81 MMHG | RESPIRATION RATE: 16 BRPM | SYSTOLIC BLOOD PRESSURE: 110 MMHG

## 2021-11-04 PROCEDURE — 99232 SBSQ HOSP IP/OBS MODERATE 35: CPT

## 2021-11-04 PROCEDURE — 70553 MRI BRAIN STEM W/O & W/DYE: CPT | Mod: 26

## 2021-11-04 RX ORDER — ASPIRIN/CALCIUM CARB/MAGNESIUM 324 MG
81 TABLET ORAL DAILY
Refills: 0 | Status: DISCONTINUED | OUTPATIENT
Start: 2021-11-04 | End: 2021-11-04

## 2021-11-04 RX ORDER — ASPIRIN/CALCIUM CARB/MAGNESIUM 324 MG
1 TABLET ORAL
Qty: 30 | Refills: 0
Start: 2021-11-04

## 2021-11-04 RX ORDER — ROSUVASTATIN CALCIUM 5 MG/1
1 TABLET ORAL
Qty: 30 | Refills: 0
Start: 2021-11-04

## 2021-11-04 RX ADMIN — Medication 1 MILLIGRAM(S): at 11:40

## 2021-11-04 NOTE — PROGRESS NOTE ADULT - REASON FOR ADMISSION
51 YEAR OLD FEMALE C/O NUMBNESS IN HER LEFT LE .
LE paresthesia

## 2021-11-04 NOTE — DISCHARGE NOTE NURSING/CASE MANAGEMENT/SOCIAL WORK - PATIENT PORTAL LINK FT
You can access the FollowMyHealth Patient Portal offered by [facility] by registering at the following website: [email or web address]. By joining [employer]’s FollowMyHealth portal, you will also be able to view your health information using other applications (apps) compatible with our system.

## 2021-11-04 NOTE — DISCHARGE NOTE PROVIDER - NSDCMRMEDTOKEN_GEN_ALL_CORE_FT
Xanax 2 mg oral tablet: 2 milligram(s) orally once a day (at bedtime), As Needed   aspirin 81 mg oral delayed release tablet: 1 tab(s) orally once a day  Crestor 40 mg oral tablet: 1 tab(s) orally once a day (at bedtime)   Xanax 2 mg oral tablet: 2 milligram(s) orally once a day (at bedtime), As Needed

## 2021-11-04 NOTE — PROGRESS NOTE ADULT - SUBJECTIVE AND OBJECTIVE BOX
Patient is a 51y old  Female who presents with a chief complaint of 51 YEAR OLD FEMALE C/O NUMBNESS IN HER LEFT LE . (02 Nov 2021 10:45)      SUBJECTIVE / OVERNIGHT EVENTS: Pending MRI. Ativan prn prior to MRI. Afterwards, can go home pending MRI results.     ADDITIONAL REVIEW OF SYSTEMS:  Le weakness  no cp or sob  anxious      MEDICATIONS  (STANDING):  aspirin enteric coated 81 milliGRAM(s) Oral daily  atorvastatin 80 milliGRAM(s) Oral at bedtime  chlorhexidine 4% Liquid 1 Application(s) Topical daily  enoxaparin Injectable 40 milliGRAM(s) SubCutaneous daily    MEDICATIONS  (PRN):  ALPRAZolam 2 milliGRAM(s) Oral at bedtime PRN ANXIETY      CAPILLARY BLOOD GLUCOSE        I&O's Summary      PHYSICAL EXAM:  Vital Signs Last 24 Hrs  T(C): 36.1 (02 Nov 2021 14:09), Max: 36.7 (02 Nov 2021 09:33)  T(F): 96.9 (02 Nov 2021 14:09), Max: 98 (02 Nov 2021 09:33)  HR: 75 (02 Nov 2021 14:09) (66 - 84)  BP: 125/88 (02 Nov 2021 14:09) (116/78 - 206/105)  BP(mean): --  RR: 18 (02 Nov 2021 14:09) (18 - 19)  SpO2: 97% (02 Nov 2021 14:09) (96% - 97%)    CONSTITUTIONAL: NAD, well-developed, well-groomed  EYES: PERRLA; conjunctiva and sclera clear  ENMT: Moist oral mucosa, no pharyngeal injection or exudates; normal dentition  NECK: Supple, no palpable masses; no thyromegaly  RESPIRATORY: Normal respiratory effort; lungs are clear to auscultation bilaterally  CARDIOVASCULAR: Regular rate and rhythm, normal S1 and S2, no murmur/rub/gallop; No lower extremity edema; Peripheral pulses are 2+ bilaterally  ABDOMEN: Nontender to palpation, normoactive bowel sounds, no rebound/guarding; No hepatosplenomegaly  MUSCULOSKELETAL:  4/5 strength in Lt UE and LE; no facial drooping but decreased fullness on lt side of face, no aphasia  PSYCH: A+O to person, place, and time; affect appropriate  NEUROLOGY: CN 2-12 are intact and symmetric; no gross sensory deficits   SKIN: No rashes; no palpable lesions    LABS:                        13.7   8.01  )-----------( 299      ( 02 Nov 2021 05:46 )             40.8     11-02    141  |  107  |  8<L>  ----------------------------<  83  4.2   |  27  |  0.8    Ca    9.1      02 Nov 2021 05:46  Mg     2.2     11-01    TPro  6.0  /  Alb  4.0  /  TBili  0.3  /  DBili  x   /  AST  13  /  ALT  15  /  AlkPhos  79  11-01      CARDIAC MARKERS ( 01 Nov 2021 20:24 )  x     / <0.01 ng/mL / x     / x     / x                RADIOLOGY & ADDITIONAL TESTS:  Results Reviewed:   Imaging Personally Reviewed:  Electrocardiogram Personally Reviewed:    COORDINATION OF CARE:  Care Discussed with Consultants/Other Providers [Y/N]:  Prior or Outpatient Records Reviewed [Y/N]:  
Neurology Progress Note    Interval History:    Pt seen on 11/3/21 in f/u for left sided paresthesias.  States symptoms resolved.   at bedside.  Pt awaiting MRI.   Requests Ativan for study.      Vital Signs Last 24 Hrs  T(C): 35.9 (04 Nov 2021 05:00), Max: 36.4 (03 Nov 2021 13:09)  T(F): 96.6 (04 Nov 2021 05:00), Max: 97.5 (03 Nov 2021 13:09)  HR: 62 (04 Nov 2021 05:00) (62 - 71)  BP: 110/81 (04 Nov 2021 05:00) (110/81 - 137/84)  BP(mean): --  RR: 16 (04 Nov 2021 05:00) (16 - 16)  SpO2: --    Neurological Exam:   Mental status: Awake, alert and oriented x3.  Recent and remote memory intact.  Naming, repetition and comprehension intact.  Attention/concentration intact.  No dysarthria, no aphasia.  Fund of knowledge appropriate.    Cranial nerves: EOM's full, facial mm symmetric.  Motor:  MRC grading 5/5 b/l UE/LE.   strength 5/5.  Normal tone and bulk.  No abnormal movements.    Sensation: Intact to light touch, pain and double simultaneous stimulation.  Coordination: No dysmetria on finger-to-nose and heel-to-shin.  No dysdiadokinesia.    NIH score = 0    MEDICATIONS  (STANDING):  aspirin enteric coated 81 milliGRAM(s) Oral daily  atorvastatin 80 milliGRAM(s) Oral at bedtime  chlorhexidine 4% Liquid 1 Application(s) Topical daily  enoxaparin Injectable 40 milliGRAM(s) SubCutaneous daily        Labs:  CBC Full  -  ( 03 Nov 2021 06:09 )  WBC Count : 7.02 K/uL  RBC Count : 4.22 M/uL  Hemoglobin : 13.9 g/dL  Hematocrit : 42.2 %  Platelet Count - Automated : 307 K/uL  Mean Cell Volume : 100.0 fL  Mean Cell Hemoglobin : 32.9 pg  Mean Cell Hemoglobin Concentration : 32.9 g/dL  Auto Neutrophil # : x  Auto Lymphocyte # : x  Auto Monocyte # : x  Auto Eosinophil # : x  Auto Basophil # : x  Auto Neutrophil % : x  Auto Lymphocyte % : x  Auto Monocyte % : x  Auto Eosinophil % : x  Auto Basophil % : x    11-03    140  |  106  |  14  ----------------------------<  84  5.5<H>   |  24  |  0.8    Ca    9.2      03 Nov 2021 06:09    TPro  5.9<L>  /  Alb  3.8  /  TBili  0.3  /  DBili  x   /  AST  13  /  ALT  13  /  AlkPhos  71  11-03    LIVER FUNCTIONS - ( 03 Nov 2021 06:09 )  Alb: 3.8 g/dL / Pro: 5.9 g/dL / ALK PHOS: 71 U/L / ALT: 13 U/L / AST: 13 U/L / GGT: x             Assessment:  This is a 51y Female w/ h/o with episode of left sided paresthesias, now resolved. CTH negative.    Plan:  1.  MRI brain w/o.  If negative for acute stroke may be discharged.    2.  If MRI positive for stroke pt would need TTE.  3.  Stroke clinic f/u in 2 weeks.
      Patient is a 51y old  Female who presents with a chief complaint of 51 YEAR OLD FEMALE C/O NUMBNESS IN HER LEFT LE . (02 Nov 2021 10:45)      SUBJECTIVE / OVERNIGHT EVENTS: Still has 4/5 strength on her left side, no facial abnormalities but decreased fullness on left side. Mother has a h/o of TIAs. Pt states she does not have HTN and is reluctant to start meds. MRI head pending. Neuro recs appreciated. Had a TIA once before.     ADDITIONAL REVIEW OF SYSTEMS:  Le weakness  no cp or sob  anxious      MEDICATIONS  (STANDING):  aspirin enteric coated 81 milliGRAM(s) Oral daily  atorvastatin 80 milliGRAM(s) Oral at bedtime  chlorhexidine 4% Liquid 1 Application(s) Topical daily  enoxaparin Injectable 40 milliGRAM(s) SubCutaneous daily    MEDICATIONS  (PRN):  ALPRAZolam 2 milliGRAM(s) Oral at bedtime PRN ANXIETY      CAPILLARY BLOOD GLUCOSE        I&O's Summary      PHYSICAL EXAM:  Vital Signs Last 24 Hrs  T(C): 36.1 (02 Nov 2021 14:09), Max: 36.7 (02 Nov 2021 09:33)  T(F): 96.9 (02 Nov 2021 14:09), Max: 98 (02 Nov 2021 09:33)  HR: 75 (02 Nov 2021 14:09) (66 - 84)  BP: 125/88 (02 Nov 2021 14:09) (116/78 - 206/105)  BP(mean): --  RR: 18 (02 Nov 2021 14:09) (18 - 19)  SpO2: 97% (02 Nov 2021 14:09) (96% - 97%)    CONSTITUTIONAL: NAD, well-developed, well-groomed  EYES: PERRLA; conjunctiva and sclera clear  ENMT: Moist oral mucosa, no pharyngeal injection or exudates; normal dentition  NECK: Supple, no palpable masses; no thyromegaly  RESPIRATORY: Normal respiratory effort; lungs are clear to auscultation bilaterally  CARDIOVASCULAR: Regular rate and rhythm, normal S1 and S2, no murmur/rub/gallop; No lower extremity edema; Peripheral pulses are 2+ bilaterally  ABDOMEN: Nontender to palpation, normoactive bowel sounds, no rebound/guarding; No hepatosplenomegaly  MUSCULOSKELETAL:  4/5 strength in Lt UE and LE; no facial drooping but decreased fullness on lt side of face, no aphasia  PSYCH: A+O to person, place, and time; affect appropriate  NEUROLOGY: CN 2-12 are intact and symmetric; no gross sensory deficits   SKIN: No rashes; no palpable lesions    LABS:                        13.7   8.01  )-----------( 299      ( 02 Nov 2021 05:46 )             40.8     11-02    141  |  107  |  8<L>  ----------------------------<  83  4.2   |  27  |  0.8    Ca    9.1      02 Nov 2021 05:46  Mg     2.2     11-01    TPro  6.0  /  Alb  4.0  /  TBili  0.3  /  DBili  x   /  AST  13  /  ALT  15  /  AlkPhos  79  11-01      CARDIAC MARKERS ( 01 Nov 2021 20:24 )  x     / <0.01 ng/mL / x     / x     / x                RADIOLOGY & ADDITIONAL TESTS:  Results Reviewed:   Imaging Personally Reviewed:  Electrocardiogram Personally Reviewed:    COORDINATION OF CARE:  Care Discussed with Consultants/Other Providers [Y/N]:  Prior or Outpatient Records Reviewed [Y/N]:  
      Patient is a 51y old  Female who presents with a chief complaint of 51 YEAR OLD FEMALE C/O NUMBNESS IN HER LEFT LE . (02 Nov 2021 10:45)      SUBJECTIVE / OVERNIGHT EVENTS: Pending MRI. Pt's lipid panel demonstrates elevated cholesterol and LDL. also has a high HDL though. Neuro recs appreciated    ADDITIONAL REVIEW OF SYSTEMS:  Le weakness  no cp or sob  anxious      MEDICATIONS  (STANDING):  aspirin enteric coated 81 milliGRAM(s) Oral daily  atorvastatin 80 milliGRAM(s) Oral at bedtime  chlorhexidine 4% Liquid 1 Application(s) Topical daily  enoxaparin Injectable 40 milliGRAM(s) SubCutaneous daily    MEDICATIONS  (PRN):  ALPRAZolam 2 milliGRAM(s) Oral at bedtime PRN ANXIETY      CAPILLARY BLOOD GLUCOSE        I&O's Summary      PHYSICAL EXAM:  Vital Signs Last 24 Hrs  T(C): 36.1 (02 Nov 2021 14:09), Max: 36.7 (02 Nov 2021 09:33)  T(F): 96.9 (02 Nov 2021 14:09), Max: 98 (02 Nov 2021 09:33)  HR: 75 (02 Nov 2021 14:09) (66 - 84)  BP: 125/88 (02 Nov 2021 14:09) (116/78 - 206/105)  BP(mean): --  RR: 18 (02 Nov 2021 14:09) (18 - 19)  SpO2: 97% (02 Nov 2021 14:09) (96% - 97%)    CONSTITUTIONAL: NAD, well-developed, well-groomed  EYES: PERRLA; conjunctiva and sclera clear  ENMT: Moist oral mucosa, no pharyngeal injection or exudates; normal dentition  NECK: Supple, no palpable masses; no thyromegaly  RESPIRATORY: Normal respiratory effort; lungs are clear to auscultation bilaterally  CARDIOVASCULAR: Regular rate and rhythm, normal S1 and S2, no murmur/rub/gallop; No lower extremity edema; Peripheral pulses are 2+ bilaterally  ABDOMEN: Nontender to palpation, normoactive bowel sounds, no rebound/guarding; No hepatosplenomegaly  MUSCULOSKELETAL:  4/5 strength in Lt UE and LE; no facial drooping but decreased fullness on lt side of face, no aphasia  PSYCH: A+O to person, place, and time; affect appropriate  NEUROLOGY: CN 2-12 are intact and symmetric; no gross sensory deficits   SKIN: No rashes; no palpable lesions    LABS:                        13.7   8.01  )-----------( 299      ( 02 Nov 2021 05:46 )             40.8     11-02    141  |  107  |  8<L>  ----------------------------<  83  4.2   |  27  |  0.8    Ca    9.1      02 Nov 2021 05:46  Mg     2.2     11-01    TPro  6.0  /  Alb  4.0  /  TBili  0.3  /  DBili  x   /  AST  13  /  ALT  15  /  AlkPhos  79  11-01      CARDIAC MARKERS ( 01 Nov 2021 20:24 )  x     / <0.01 ng/mL / x     / x     / x                RADIOLOGY & ADDITIONAL TESTS:  Results Reviewed:   Imaging Personally Reviewed:  Electrocardiogram Personally Reviewed:    COORDINATION OF CARE:  Care Discussed with Consultants/Other Providers [Y/N]:  Prior or Outpatient Records Reviewed [Y/N]:

## 2021-11-04 NOTE — DISCHARGE NOTE PROVIDER - CARE PROVIDER_API CALL
Gonzalo Talbot (DO)  Infectious Disease; Internal Medicine  [address] [place] [id number]  Phone: [phone]  Fax: [phone]  Follow Up Time: 2 weeks    Blair Ladd)  Neurology  [address] [place] [address], [address]  [place] [address]  Phone: [phone]  Fax: [phone]  Follow Up Time: 1 week

## 2021-11-04 NOTE — DISCHARGE NOTE PROVIDER - HOSPITAL COURSE
51 YEAR OLD FEMALE WITH PMH OF ANXIETY COME TO THE ER C/O LEFT LOWER EXTREMITY START EARLY TONIGHT . PATIENT STATES THE THE  SYMPTOMS RESOLVED NOW . PATIENT STATES THAT SHE HAS PREVIOUS EPISODE LIKE THAT IN THE PAST AND HER BLOOD PRESSURE WAS HIGH THAT TIME AND RESOLVED ALSO . PATIENT DENIES ANY OTHER SYMPTOMS IN HER BODY .     Pt was seen by Neurology who recommended an MRI Brain. Pt had MRI which revealed some scattered foci in white matter c/w microvascular changes, demylenating disease, migraine or post inflammatory process.     Pt should fup OP Neuro, cont ASA and statin. 52 yo F with a pmhx of anxiety came in with Lt sided paresthesia excluding the face. States she had a similar episode 3 yrs that also similarly resolved. NHCT was neg. MRI head demonstrated nonspecific several scattered foci of white matter signal abnormality which can be seen in the setting of chronic microvascular ischemic changes. Pt's bloodwork demonstrated increased total cholesterol and LDL  and for primary prevention of stroke with statin and aspirin.  BP was controlled and likely elevated in the setting of stress on admission. EKG was NSR with no LVH. Pt will need to follow up with neurology for follow up and maintenance along with her PCP.     Plan explained and understood by pt and her  bedside.

## 2021-11-04 NOTE — PROGRESS NOTE ADULT - ASSESSMENT
52 yo F with a pmhx of TIA presenting with Lt sided weakness    #TIA  - started on asa and statin  - CTA head and neck wnl  - MRI brain pending  - lipid panel, tsh, hba1c ordered  - control BP  - fall precautions    #HTN  - will hold off on antihypertensives as BP fluctuating from high to wnl  - no LVH on EKG    #anxiety  - c/w xanax    #proph  - vte: lovenox
50 yo F with a pmhx of TIA presenting with Lt sided weakness    #TIA vs stroke  - started on asa and statin  - CTA head and neck wnl  - MRI brain pending  - Hba1c wnl  - control BP  - fall precautions    #HTN  - will hold off on antihypertensives as BP fluctuating from high to wnl  - no LVH on EKG    #anxiety  - c/w xanax    #HLD  - increased TC and LDL  - increased HDL  - will need to continue statin at home    #proph  - vte: lovenox
52 yo F with a pmhx of TIA presenting with Lt sided weakness    #TIA  - started on asa and statin  - CTA head and neck wnl  - MRI brain pending  - Hba1c wnl  - control BP  - fall precautions    #HTN  - will hold off on antihypertensives as BP fluctuating from high to wnl  - no LVH on EKG    #anxiety  - c/w xanax    #HLD  - increased TC and LDL  - increased HDL  - will need to continue statin at home    #proph  - vte: lovenox

## 2021-11-04 NOTE — DISCHARGE NOTE PROVIDER - PROVIDER TOKENS
PROVIDER:[TOKEN:[[id number]],FOLLOWUP:[2 weeks]],PROVIDER:[TOKEN:[[id number]],FOLLOWUP:[1 week]]

## 2021-11-04 NOTE — DISCHARGE NOTE PROVIDER - NSDCCPCAREPLAN_GEN_ALL_CORE_FT
PRINCIPAL DISCHARGE DIAGNOSIS  Diagnosis: Paresthesia  Assessment and Plan of Treatment: cont ASA 81mg daily   Cont Crestor 40mg Daily  FUP Neuro

## 2021-11-05 PROBLEM — Z00.00 ENCOUNTER FOR PREVENTIVE HEALTH EXAMINATION: Status: ACTIVE | Noted: 2021-11-05

## 2021-11-10 DIAGNOSIS — F41.9 ANXIETY DISORDER, UNSPECIFIED: ICD-10-CM

## 2021-11-10 DIAGNOSIS — F17.210 NICOTINE DEPENDENCE, CIGARETTES, UNCOMPLICATED: ICD-10-CM

## 2021-11-10 DIAGNOSIS — E78.5 HYPERLIPIDEMIA, UNSPECIFIED: ICD-10-CM

## 2021-11-10 DIAGNOSIS — R20.0 ANESTHESIA OF SKIN: ICD-10-CM

## 2021-11-10 DIAGNOSIS — I10 ESSENTIAL (PRIMARY) HYPERTENSION: ICD-10-CM

## 2021-11-10 DIAGNOSIS — G45.9 TRANSIENT CEREBRAL ISCHEMIC ATTACK, UNSPECIFIED: ICD-10-CM

## 2021-11-10 DIAGNOSIS — Z86.73 PERSONAL HISTORY OF TRANSIENT ISCHEMIC ATTACK (TIA), AND CEREBRAL INFARCTION WITHOUT RESIDUAL DEFICITS: ICD-10-CM

## 2021-11-10 DIAGNOSIS — G81.94 HEMIPLEGIA, UNSPECIFIED AFFECTING LEFT NONDOMINANT SIDE: ICD-10-CM

## 2021-11-10 DIAGNOSIS — Z80.51 FAMILY HISTORY OF MALIGNANT NEOPLASM OF KIDNEY: ICD-10-CM

## 2021-12-10 ENCOUNTER — APPOINTMENT (OUTPATIENT)
Age: 51
End: 2021-12-10

## 2022-01-28 NOTE — DISCHARGE NOTE NURSING/CASE MANAGEMENT/SOCIAL WORK - NSFLUVACAGEDISCH_IMM_ALL_CORE
Notification Instructions: Patient will be notified of biopsy results. However, patient instructed to call the office if not contacted within 2 weeks. Adult Dressing: bandage Silver Nitrate Text: The wound bed was treated with silver nitrate after the biopsy was performed. Anesthesia Volume In Cc (Will Not Render If 0): 0.5 Detail Level: Detailed Billing Type: Third-Party Bill X Size Of Lesion In Cm: 0 Information: Selecting Yes will display possible errors in your note based on the variables you have selected. This validation is only offered as a suggestion for you. PLEASE NOTE THAT THE VALIDATION TEXT WILL BE REMOVED WHEN YOU FINALIZE YOUR NOTE. IF YOU WANT TO FAX A PRELIMINARY NOTE YOU WILL NEED TO TOGGLE THIS TO 'NO' IF YOU DO NOT WANT IT IN YOUR FAXED NOTE. Consent: Written consent was obtained and risks were reviewed including but not limited to scarring, infection, bleeding, scabbing, incomplete removal, nerve damage and allergy to anesthesia. Validate Anticipated Plan: No Cryotherapy Text: The wound bed was treated with cryotherapy after the biopsy was performed. Curettage Text: The wound bed was treated with curettage after the biopsy was performed. Type Of Destruction Used: Curettage Post-Care Instructions: I reviewed with the patient in detail post-care instructions. Patient is to keep the biopsy site dry overnight, and then apply bacitracin twice daily until healed. Patient may apply hydrogen peroxide soaks to remove any crusting. Electrodesiccation And Curettage Text: The wound bed was treated with electrodesiccation and curettage after the biopsy was performed. Anesthesia Type: 1% lidocaine with epinephrine Wound Care: Vaseline Biopsy Method: scissors Hemostasis: Drysol Depth Of Biopsy: dermis Biopsy Type: H and E Electrodesiccation Text: The wound bed was treated with electrodesiccation after the biopsy was performed.

## 2022-05-02 NOTE — ED PROCEDURE NOTE - CPROC ED TIME OUT STATEMENT1
“Patient's name, , procedure and correct site were confirmed during the [facility] Timeout.”
PAST SURGICAL HISTORY:  No significant past surgical history

## 2022-07-14 NOTE — H&P ADULT - HISTORY OF PRESENT ILLNESS
Per [facility]: \"[facility] Shipped to Patient - [facility] [phone]. \"
51 YEAR OLD FEMALE WITH PMH OF ANXIETY COME TO THE ER C/O LEFT LOWER EXTREMITY START EARLY TONIGHT . PATIENT STATES THE THE  SYMPTOMS RESOLVED NOW . PATIENT STATES THAT SHE HAS PREVIOUS EPISODE LIKE THAT IN THE PAST AND HER BLOOD PRESSURE WAS HIGH THAT TIME AND RESOLVED ALSO . PATIENT DENIES ANY OTHER SYMPTOMS IN HER BODY .

## 2022-07-21 ENCOUNTER — RESULT REVIEW (OUTPATIENT)
Age: 52
End: 2022-07-21

## 2022-07-21 ENCOUNTER — TRANSCRIPTION ENCOUNTER (OUTPATIENT)
Age: 52
End: 2022-07-21

## 2022-07-21 ENCOUNTER — OUTPATIENT (OUTPATIENT)
Dept: OUTPATIENT SERVICES | Facility: HOSPITAL | Age: 52
LOS: 1 days | Discharge: HOME | End: 2022-07-21

## 2022-07-21 VITALS
DIASTOLIC BLOOD PRESSURE: 85 MMHG | WEIGHT: 134.92 LBS | SYSTOLIC BLOOD PRESSURE: 129 MMHG | HEART RATE: 72 BPM | TEMPERATURE: 97 F | RESPIRATION RATE: 18 BRPM | HEIGHT: 62 IN

## 2022-07-21 VITALS
TEMPERATURE: 97 F | DIASTOLIC BLOOD PRESSURE: 96 MMHG | OXYGEN SATURATION: 98 % | HEART RATE: 69 BPM | SYSTOLIC BLOOD PRESSURE: 150 MMHG | RESPIRATION RATE: 23 BRPM

## 2022-07-21 DIAGNOSIS — Z90.710 ACQUIRED ABSENCE OF BOTH CERVIX AND UTERUS: Chronic | ICD-10-CM

## 2022-07-21 PROCEDURE — 88312 SPECIAL STAINS GROUP 1: CPT | Mod: 26

## 2022-07-21 PROCEDURE — 88305 TISSUE EXAM BY PATHOLOGIST: CPT | Mod: 26

## 2022-07-21 NOTE — CHART NOTE - NSCHARTNOTEFT_GEN_A_CORE
PACU ANESTHESIA ADMISSION NOTE      Procedure: EGD   Post op diagnosis:  GERD & gastritis    ____  Intubated  TV:______       Rate: ______      FiO2: ______    _x___  Patent Airway    _x___  Full return of protective reflexes    _x___  Full recovery from anesthesia / back to baseline status    Vitals:  T(C): 97.7F  HR: 78  BP: 121/68  RR: 20  SpO2: 98%    Mental Status:  _x___ Awake   _____ Alert   _____ Drowsy   _____ Sedated    Nausea/Vomiting:  _x___  NO       ______Yes,   See Post - Op Orders         Pain Scale (0-10):  __0___    Treatment: _x___ None    ____ See Post - Op/PCA Orders    Post - Operative Fluids:   __x__ Oral   ____ See Post - Op Orders    Plan: Discharge:   _x___Home       _____Floor     _____Critical Care    _____  Other:_________________    Comments:  No anesthesia issues or complications noted.  Discharge when criteria met.

## 2022-07-21 NOTE — ASU PATIENT PROFILE, ADULT - FALL HARM RISK - UNIVERSAL INTERVENTIONS
Bed in lowest position, wheels locked, appropriate side rails in place/Call bell, personal items and telephone in reach/Instruct patient to call for assistance before getting out of bed or chair/Non-slip footwear when patient is out of bed/[place] to call system/Physically safe environment - no spills, clutter or unnecessary equipment/Purposeful Proactive Rounding/Room/bathroom lighting operational, light cord in reach

## 2022-07-24 LAB — SURGICAL PATHOLOGY STUDY: SIGNIFICANT CHANGE UP

## 2022-07-26 DIAGNOSIS — F17.210 NICOTINE DEPENDENCE, CIGARETTES, UNCOMPLICATED: ICD-10-CM

## 2022-07-26 DIAGNOSIS — K20.0 EOSINOPHILIC ESOPHAGITIS: ICD-10-CM

## 2022-07-26 DIAGNOSIS — Z79.82 LONG TERM (CURRENT) USE OF ASPIRIN: ICD-10-CM

## 2022-07-26 DIAGNOSIS — Z86.73 PERSONAL HISTORY OF TRANSIENT ISCHEMIC ATTACK (TIA), AND CEREBRAL INFARCTION WITHOUT RESIDUAL DEFICITS: ICD-10-CM

## 2022-07-26 DIAGNOSIS — K29.50 UNSPECIFIED CHRONIC GASTRITIS WITHOUT BLEEDING: ICD-10-CM

## 2022-07-26 DIAGNOSIS — K29.80 DUODENITIS WITHOUT BLEEDING: ICD-10-CM

## 2022-07-26 DIAGNOSIS — Z90.710 ACQUIRED ABSENCE OF BOTH CERVIX AND UTERUS: ICD-10-CM

## 2022-07-26 DIAGNOSIS — R10.13 EPIGASTRIC PAIN: ICD-10-CM

## 2022-07-28 ENCOUNTER — TRANSCRIPTION ENCOUNTER (OUTPATIENT)
Age: 52
End: 2022-07-28

## 2022-07-28 ENCOUNTER — RESULT REVIEW (OUTPATIENT)
Age: 52
End: 2022-07-28

## 2022-07-28 ENCOUNTER — OUTPATIENT (OUTPATIENT)
Dept: OUTPATIENT SERVICES | Facility: HOSPITAL | Age: 52
LOS: 1 days | Discharge: HOME | End: 2022-07-28

## 2022-07-28 VITALS
TEMPERATURE: 97 F | WEIGHT: 130.07 LBS | SYSTOLIC BLOOD PRESSURE: 140 MMHG | HEIGHT: 62 IN | RESPIRATION RATE: 18 BRPM | HEART RATE: 69 BPM | DIASTOLIC BLOOD PRESSURE: 85 MMHG

## 2022-07-28 VITALS
OXYGEN SATURATION: 98 % | DIASTOLIC BLOOD PRESSURE: 62 MMHG | SYSTOLIC BLOOD PRESSURE: 144 MMHG | HEART RATE: 60 BPM | RESPIRATION RATE: 18 BRPM

## 2022-07-28 DIAGNOSIS — Z90.710 ACQUIRED ABSENCE OF BOTH CERVIX AND UTERUS: Chronic | ICD-10-CM

## 2022-07-28 PROCEDURE — 88305 TISSUE EXAM BY PATHOLOGIST: CPT | Mod: 26

## 2022-07-28 RX ORDER — ALPRAZOLAM 0.25 MG
2 TABLET ORAL
Qty: 0 | Refills: 0 | DISCHARGE

## 2022-07-28 NOTE — ASU DISCHARGE PLAN (ADULT/PEDIATRIC) - NS MD DC FALL RISK RISK
For information on Fall & Injury Prevention, visit: [email or web address] OR  [email or web address] OR  https://www.cdc.gov/steadi/patient.html

## 2022-07-28 NOTE — CHART NOTE - NSCHARTNOTEFT_GEN_A_CORE
PACU ANESTHESIA ADMISSION NOTE      Procedure: colonoscopy  Post op diagnosis:      ____  Intubated  TV:______       Rate: ______      FiO2: ______    _x___  Patent Airway    _x___  Full return of protective reflexes    _x___  Full recovery from anesthesia / back to baseline status    Vitals:            T:  n/a              BP :  126/82              R:  20            Sat:  100             P:64      Mental Status:  _x___ Awake   _____ Alert   _____ Drowsy   _____ Sedated    Nausea/Vomiting:  _x___  NO       ______Yes,   See Post - Op Orders         Pain Scale (0-10):  __0___    Treatment: _x___ None    ____ See Post - Op/PCA Orders    Post - Operative Fluids:   __x__ Oral   ____ See Post - Op Orders    Plan: Discharge:   _x___Home       _____Floor     _____Critical Care    _____  Other:_________________    Comments:  No anesthesia issues or complications noted.  Discharge when criteria met.

## 2022-07-29 LAB — SURGICAL PATHOLOGY STUDY: SIGNIFICANT CHANGE UP

## 2022-08-01 DIAGNOSIS — K57.30 DIVERTICULOSIS OF LARGE INTESTINE WITHOUT PERFORATION OR ABSCESS WITHOUT BLEEDING: ICD-10-CM

## 2022-08-01 DIAGNOSIS — F17.210 NICOTINE DEPENDENCE, CIGARETTES, UNCOMPLICATED: ICD-10-CM

## 2022-08-01 DIAGNOSIS — Z79.82 LONG TERM (CURRENT) USE OF ASPIRIN: ICD-10-CM

## 2022-08-01 DIAGNOSIS — Z12.11 ENCOUNTER FOR SCREENING FOR MALIGNANT NEOPLASM OF COLON: ICD-10-CM

## 2022-08-01 DIAGNOSIS — K64.8 OTHER HEMORRHOIDS: ICD-10-CM

## 2022-10-30 NOTE — ASU PATIENT PROFILE, ADULT - FALL HARM RISK - UNIVERSAL INTERVENTIONS
Detail Level: Detailed Hide Additional Notes?: No Bed in lowest position, wheels locked, appropriate side rails in place/Call bell, personal items and telephone in reach/Instruct patient to call for assistance before getting out of bed or chair/Non-slip footwear when patient is out of bed/[place] to call system/Physically safe environment - no spills, clutter or unnecessary equipment/Purposeful Proactive Rounding/Room/bathroom lighting operational, light cord in reach no

## 2022-11-15 ENCOUNTER — EMERGENCY (EMERGENCY)
Facility: HOSPITAL | Age: 52
LOS: 0 days | Discharge: LEFT BEFORE DISCHARGE | End: 2022-11-15
Attending: EMERGENCY MEDICINE | Admitting: INTERNAL MEDICINE

## 2022-11-15 VITALS
DIASTOLIC BLOOD PRESSURE: 108 MMHG | HEART RATE: 97 BPM | TEMPERATURE: 98 F | OXYGEN SATURATION: 99 % | SYSTOLIC BLOOD PRESSURE: 177 MMHG | RESPIRATION RATE: 18 BRPM

## 2022-11-15 VITALS
DIASTOLIC BLOOD PRESSURE: 89 MMHG | SYSTOLIC BLOOD PRESSURE: 144 MMHG | HEART RATE: 72 BPM | OXYGEN SATURATION: 97 % | RESPIRATION RATE: 16 BRPM

## 2022-11-15 DIAGNOSIS — F17.200 NICOTINE DEPENDENCE, UNSPECIFIED, UNCOMPLICATED: ICD-10-CM

## 2022-11-15 DIAGNOSIS — R20.2 PARESTHESIA OF SKIN: ICD-10-CM

## 2022-11-15 DIAGNOSIS — Z79.82 LONG TERM (CURRENT) USE OF ASPIRIN: ICD-10-CM

## 2022-11-15 DIAGNOSIS — Z86.73 PERSONAL HISTORY OF TRANSIENT ISCHEMIC ATTACK (TIA), AND CEREBRAL INFARCTION WITHOUT RESIDUAL DEFICITS: ICD-10-CM

## 2022-11-15 DIAGNOSIS — Z90.710 ACQUIRED ABSENCE OF BOTH CERVIX AND UTERUS: Chronic | ICD-10-CM

## 2022-11-15 DIAGNOSIS — I10 ESSENTIAL (PRIMARY) HYPERTENSION: ICD-10-CM

## 2022-11-15 DIAGNOSIS — R20.0 ANESTHESIA OF SKIN: ICD-10-CM

## 2022-11-15 DIAGNOSIS — Z53.29 PROCEDURE AND TREATMENT NOT CARRIED OUT BECAUSE OF PATIENT'S DECISION FOR OTHER REASONS: ICD-10-CM

## 2022-11-15 LAB
ALBUMIN SERPL ELPH-MCNC: 4.4 G/DL — SIGNIFICANT CHANGE UP (ref 3.5–5.2)
ALP SERPL-CCNC: 83 U/L — SIGNIFICANT CHANGE UP (ref 30–115)
ALT FLD-CCNC: 13 U/L — SIGNIFICANT CHANGE UP (ref 0–41)
ANION GAP SERPL CALC-SCNC: 10 MMOL/L — SIGNIFICANT CHANGE UP (ref 7–14)
APTT BLD: 28.9 SEC — SIGNIFICANT CHANGE UP (ref 27–39.2)
AST SERPL-CCNC: 14 U/L — SIGNIFICANT CHANGE UP (ref 0–41)
BASOPHILS # BLD AUTO: 0.02 K/UL — SIGNIFICANT CHANGE UP (ref 0–0.2)
BASOPHILS NFR BLD AUTO: 0.2 % — SIGNIFICANT CHANGE UP (ref 0–1)
BILIRUB SERPL-MCNC: 0.4 MG/DL — SIGNIFICANT CHANGE UP (ref 0.2–1.2)
BUN SERPL-MCNC: 7 MG/DL — LOW (ref 10–20)
CALCIUM SERPL-MCNC: 9.6 MG/DL — SIGNIFICANT CHANGE UP (ref 8.4–10.4)
CHLORIDE SERPL-SCNC: 104 MMOL/L — SIGNIFICANT CHANGE UP (ref 98–110)
CO2 SERPL-SCNC: 27 MMOL/L — SIGNIFICANT CHANGE UP (ref 17–32)
CREAT SERPL-MCNC: 0.7 MG/DL — SIGNIFICANT CHANGE UP (ref 0.7–1.5)
EGFR: 103 ML/MIN/1.73M2 — SIGNIFICANT CHANGE UP
EOSINOPHIL # BLD AUTO: 0.09 K/UL — SIGNIFICANT CHANGE UP (ref 0–0.7)
EOSINOPHIL NFR BLD AUTO: 0.9 % — SIGNIFICANT CHANGE UP (ref 0–8)
GLUCOSE SERPL-MCNC: 92 MG/DL — SIGNIFICANT CHANGE UP (ref 70–99)
HCG SERPL QL: NEGATIVE — SIGNIFICANT CHANGE UP
HCT VFR BLD CALC: 45.6 % — SIGNIFICANT CHANGE UP (ref 37–47)
HGB BLD-MCNC: 15.8 G/DL — SIGNIFICANT CHANGE UP (ref 12–16)
IMM GRANULOCYTES NFR BLD AUTO: 0.2 % — SIGNIFICANT CHANGE UP (ref 0.1–0.3)
INR BLD: 0.97 RATIO — SIGNIFICANT CHANGE UP (ref 0.65–1.3)
LYMPHOCYTES # BLD AUTO: 2.84 K/UL — SIGNIFICANT CHANGE UP (ref 1.2–3.4)
LYMPHOCYTES # BLD AUTO: 30 % — SIGNIFICANT CHANGE UP (ref 20.5–51.1)
MCHC RBC-ENTMCNC: 33.8 PG — HIGH (ref 27–31)
MCHC RBC-ENTMCNC: 34.6 G/DL — SIGNIFICANT CHANGE UP (ref 32–37)
MCV RBC AUTO: 97.4 FL — SIGNIFICANT CHANGE UP (ref 81–99)
MONOCYTES # BLD AUTO: 0.52 K/UL — SIGNIFICANT CHANGE UP (ref 0.1–0.6)
MONOCYTES NFR BLD AUTO: 5.5 % — SIGNIFICANT CHANGE UP (ref 1.7–9.3)
NEUTROPHILS # BLD AUTO: 5.99 K/UL — SIGNIFICANT CHANGE UP (ref 1.4–6.5)
NEUTROPHILS NFR BLD AUTO: 63.2 % — SIGNIFICANT CHANGE UP (ref 42.2–75.2)
NRBC # BLD: 0 /100 WBCS — SIGNIFICANT CHANGE UP (ref 0–0)
PLATELET # BLD AUTO: 345 K/UL — SIGNIFICANT CHANGE UP (ref 130–400)
POTASSIUM SERPL-MCNC: 4.4 MMOL/L — SIGNIFICANT CHANGE UP (ref 3.5–5)
POTASSIUM SERPL-SCNC: 4.4 MMOL/L — SIGNIFICANT CHANGE UP (ref 3.5–5)
PROT SERPL-MCNC: 6.9 G/DL — SIGNIFICANT CHANGE UP (ref 6–8)
PROTHROM AB SERPL-ACNC: 11.1 SEC — SIGNIFICANT CHANGE UP (ref 9.95–12.87)
RBC # BLD: 4.68 M/UL — SIGNIFICANT CHANGE UP (ref 4.2–5.4)
RBC # FLD: 13.2 % — SIGNIFICANT CHANGE UP (ref 11.5–14.5)
SODIUM SERPL-SCNC: 141 MMOL/L — SIGNIFICANT CHANGE UP (ref 135–146)
WBC # BLD: 9.48 K/UL — SIGNIFICANT CHANGE UP (ref 4.8–10.8)
WBC # FLD AUTO: 9.48 K/UL — SIGNIFICANT CHANGE UP (ref 4.8–10.8)

## 2022-11-15 PROCEDURE — 99285 EMERGENCY DEPT VISIT HI MDM: CPT

## 2022-11-15 PROCEDURE — 70450 CT HEAD/BRAIN W/O DYE: CPT | Mod: 26,MA

## 2022-11-15 NOTE — ED PROVIDER NOTE - NSFOLLOWUPINSTRUCTIONS_ED_ALL_ED_FT
Transient Ischemic Attack    WHAT YOU NEED TO KNOW:    A transient ischemic attack (TIA), or mini-stroke, happens when blood cannot flow to part of the brain. A TIA only lasts minutes to hours and does not cause lasting damage. It is still important to get immediate medical care. A TIA may be a warning that you are about to have an ischemic stroke. An ischemic stroke happens when blood flow to the brain is suddenly blocked, usually by a blood clot.  Ischemic Stroke         DISCHARGE INSTRUCTIONS:    Call your local emergency number (911 in the ) or have someone else call if:   •You have any of the following signs of a stroke: ?Numbness or drooping on one side of your face       ?Weakness in an arm or leg      ?Confusion or difficulty speaking      ?Dizziness, a severe headache, or vision loss    BE FAST SIGNS OF A STROKE         •You have a seizure.      •You have chest pain or shortness of breath.      •You cough up blood.      Seek care immediately if:   •Your arm or leg feels warm, tender, and painful. It may look swollen and red.      •You have unusual or heavy bleeding.      •You have a severe headache or feel dizzy.      Call your doctor or neurologist if:   •Your blood pressure or blood sugar level is higher or lower than you were told it should be.      •You have questions or concerns about your condition or care.      Warning signs of a stroke: The words BE FAST can help you remember and recognize warning signs of a stroke:  •B = Balance: Sudden loss of balance      •E = Eyes: Loss of vision in one or both eyes      •F = Face: Face droops on one side      •A = Arms: Arm drops when both arms are raised      •S = Speech: Speech is slurred or sounds different      •T = Time: Time to get help immediately    BE FAST SIGNS OF A STROKE         Medicines: You may need any of the following:   •Antiplatelets, such as aspirin, help prevent blood clots. Take your antiplatelet medicine exactly as directed. These medicines make it more likely for you to bleed or bruise. If you are told to take aspirin, do not take acetaminophen or ibuprofen instead.      •Blood thinners help prevent blood clots. Clots can cause strokes, heart attacks, and death. The following are general safety guidelines to follow while you are taking a blood thinner:?Watch for bleeding and bruising while you take blood thinners. Watch for bleeding from your gums or nose. Watch for blood in your urine and bowel movements. Use a soft washcloth on your skin, and a soft toothbrush to brush your teeth. This can keep your skin and gums from bleeding. If you shave, use an electric shaver. Do not play contact sports.       ?Tell your dentist and other healthcare providers that you take a blood thinner. Wear a bracelet or necklace that says you take this medicine.       ?Do not start or stop any other medicines unless your healthcare provider tells you to. Many medicines cannot be used with blood thinners.      ?Take your blood thinner exactly as prescribed by your healthcare provider. Do not skip does or take less than prescribed. Tell your provider right away if you forget to take your blood thinner, or if you take too much.      ?Warfarin is a blood thinner that you may need to take. The following are things you should be aware of if you take warfarin: ?Foods and medicines can affect the amount of warfarin in your blood. Do not make major changes to your diet while you take warfarin. Warfarin works best when you eat about the same amount of vitamin K every day. Vitamin K is found in green leafy vegetables and certain other foods. Ask for more information about what to eat when you are taking warfarin.      ?You will need to see your healthcare provider for follow-up visits when you are on warfarin. You will need regular blood tests. These tests are used to decide how much medicine you need.         •Other medicines may be needed to treat diabetes, depression, high cholesterol, or blood pressure problems. You may also need medicine to decrease the pressure in your brain, reduce pain, or prevent seizures.      •Take your medicine as directed. Contact your healthcare provider if you think your medicine is not helping or if you have side effects. Tell your provider if you are allergic to any medicine. Keep a list of the medicines, vitamins, and herbs you take. Include the amounts, and when and why you take them. Bring the list or the pill bottles to follow-up visits. Carry your medicine list with you in case of an emergency.      What you can do to prevent another TIA or a stroke:   •Manage health conditions. A condition such as diabetes can increase your risk for a stroke. Control your blood sugar level if you have hyperglycemia or diabetes. Take your prescribed medicines and check your blood sugar level as directed.  How to check your blood sugar           •Check your blood pressure as directed. High blood pressure can increase your risk for a stroke. If you have high blood pressure, follow your healthcare provider’s directions for controlling your blood pressure.   How to take a Blood Pressure           •Do not use nicotine products or illegal drugs. Nicotine and other chemicals in cigarettes and cigars can cause blood vessel damage. Nicotine and illegal drugs both increase your risk for a stroke. Ask your healthcare provider for information if you currently smoke or use drugs and need help to quit. E- cigarettes or smokeless tobacco still contain nicotine. Talk to your healthcare provider before you use these products.      •Talk to your healthcare provider about alcohol. Alcohol can raise your blood pressure. The recommended limit is 2 drinks in a day for men and 1 drink in a day for women. Do not binge drink or save a week's worth of alcohol to drink in 1 or 2 days. Limit weekly amounts as directed by your provider.      •Eat a variety of healthy foods. Healthy foods include whole-grain breads, low-fat dairy products, beans, lean meats, and fish. Eat at least 5 servings of fruits and vegetables each day. Choose foods that are low in fat, cholesterol, salt, and sugar. Eat foods that are high in potassium, such as potatoes and bananas. A dietitian can help you create healthy meal plans.   Healthy Foods           •Maintain a healthy weight. Ask your healthcare provider how much you should weigh. Ask him or her to help you create a weight loss plan if you are overweight. He or she can help you create small goals if you have a lot of weight to lose.      •Exercise as directed. Exercise can lower your blood pressure, cholesterol, weight, and blood sugar levels. Healthcare providers will help you create exercise goals. They can also help you make a plan to reach your goals. For example, you can break exercise into 10 minute periods, 3 times in the day. Find an exercise that you enjoy. This will make it easier for you to reach your exercise goals.  Walking for Exercise           •Manage stress. Stress can raise your blood pressure. Find ways to relax, such as deep breathing or listening to music.      Follow up with your doctor or neurologist in 1 to 2 days: Write down your questions so you remember to ask them during your visits.

## 2022-11-15 NOTE — ED ADULT NURSE NOTE - OBJECTIVE STATEMENT
pt presents co headache and ble numbness. Pt endorses past hx of BLE numbness and TIAs, not followed by nuerology. Pt aaox4, speaking in clear and complete sentences.

## 2022-11-15 NOTE — ED ADULT TRIAGE NOTE - CHIEF COMPLAINT QUOTE
Sent in by Dr. Talbot for elevated BP. NIH 0 Sent in by Dr. Talbot for elevated BP. NIH 0. C/o heaadache and bilateral leg numbness.

## 2022-11-15 NOTE — ED ADULT NURSE NOTE - BEFAST SPEECH SLURRED
----- Message from Alanna Courtney [phone]. Pharmacy- [facility] [phone]. Last appt. 7/17/18. Next appt. 1/22/19.
No

## 2022-11-15 NOTE — ED PROVIDER NOTE - PHYSICAL EXAMINATION
CONSTITUTIONAL: Well-developed; well-nourished; in no acute distress.   SKIN: warm, dry  HEAD: Normocephalic  EYES: no conjunctival injection. PERRLA. EOMI.   ENT: No nasal discharge; airway clear.  NECK: Supple; non tender.  CARD: S1, S2 normal;  Regular rate and rhythm.   RESP: No wheezes, rales or rhonchi.  ABD: soft ntnd  EXT: Normal ROM.  No clubbing, cyanosis or edema.   NEURO: Alert, oriented, grossly unremarkable. CN 2-12 grossly intact. Strength UE and LE 5/5 equal b/l. Sensation equal b/l LE but decreased subjectively overall. Finger-to-nose intact. Normal gait and ambulates steadily independently.  PSYCH: Cooperative, appropriate.

## 2022-11-15 NOTE — ED PROVIDER NOTE - CLINICAL SUMMARY MEDICAL DECISION MAKING FREE TEXT BOX
Labs reviewed and unremarkable.  Head CT with no acute pathology.  CTA head/neck ordered, neuro consult to come and see patient.  Prior to getting CTAs or neuro evaluation patient stated she wanted to leave ER and go home.  Feeling better and wants to follow-up as outpatient.  Patient told that work-up incomplete, and that leaving could result in death or permanent disability.  Patient understands, but still wants to go.  To sign out AMA.  Patient told to follow-up with neurology as outpatient as soon as possible.  Told she can return to ER at any time to continue her evaluation.

## 2022-11-15 NOTE — ED PROVIDER NOTE - PROGRESS NOTE DETAILS
CP: neurology consulted CP: Patient states she wishes to leave. Discussed the risk of leaving. She verbalized understanding and sstill wishes to leave. Given strict return precautions and follow up with neurology. Patient not yet seen by neurology.

## 2022-11-15 NOTE — ED PROVIDER NOTE - NS ED ROS FT
GEN:  no fever, no chills, no generalized weakness  NEURO:  no headache, no dizziness, +b/l LE numbness  ENT: no sore throat, no runny nose  CV:  no chest pain, no palpitations  RESP:  no sob, no cough  GI:  no nausea, no vomiting, no abdominal pain  MSK:  no joint pain, no edema  SKIN:  no rash, no bruising

## 2022-11-15 NOTE — ED PROVIDER NOTE - CARE PROVIDERS DIRECT ADDRESSES
[email or web address]

## 2022-11-15 NOTE — ED PROVIDER NOTE - ATTENDING CONTRIBUTION TO CARE
52-year-old female with h/o TIA 1 year ago, in ER with c/o paresthesias to bilateral lower extremities, which started yesterday.  Also had HA yesterday and had a period of feeling dizzy/off balance, now resolved.  Denies any motor weakness.  No difficulty with speech/ambulation.  Paresthesias are to both lower extremities, denies unilateral symptoms.  No CP/SOB.  No abdominal pain.  No N/V/D.  No F/C.  Patient also states her BP is elevated.  States she had same symptoms 1 year ago when she had TIA (Per chart review, patient only had LLE paresthesias at that time however).  PE - nad, nc/at, eomi, perrl, op - clear, mmm, neck supple, cta b/l, no w/r/r, rrr, abd- soft, nt/nd, nabs, from x 4, no LE swelling/tenderness, A&O x 3, no facial droop, + clear speech, cn 2-12 intact, motor 5/5 b/l UE and LE, sensation intact x 4, pt reports decreased subjective sensation to b/l LE's, FTN/HLS intact b/l.  -Check labs, head CT, neuro eval

## 2022-11-15 NOTE — ED PROVIDER NOTE - OBJECTIVE STATEMENT
53 yo female, PMHx of TIA in 2021, presents with numbness of b/l LE that started 4 pm yesterday, no alleviating or aggravating factors, associated with hypertension. She states when she was diagnosed with the TIA she had similar symptoms of HTN and lower extremity paresthesias. Does not follow with neurologist. Denies fevers, chills, nausea, vomiting, headache, dizziness, vision changes, weakness, chest pain, shortness of breath, abdominal pain, inability to ambulate, trauma.

## 2022-11-15 NOTE — ED PROVIDER NOTE - PATIENT PORTAL LINK FT
You can access the FollowMyHealth Patient Portal offered by [facility] by registering at the following website: [email or web address]. By joining [employer]’s FollowMyHealth portal, you will also be able to view your health information using other applications (apps) compatible with our system.

## 2022-11-15 NOTE — ED PROVIDER NOTE - CARE PROVIDER_API CALL
Quoc Alberts)  EEGEpilepsy; Neurology  [address] [place] [address], [address]  [place] [address]  Phone: [phone]  Fax: [phone]  Follow Up Time: 1-3 Days

## 2022-12-22 NOTE — ASU PATIENT PROFILE, ADULT - MEDICATION ADMINISTRATION INFO, PROFILE
I received sign out from Dr. Gómez to follow up on DEC. Per DEC will arrange for f/u but agrees to safety contract. Appropriate for discharge      Osmin Florentino MD  12/22/22 [id number]     no concerns

## 2023-08-28 NOTE — ASU PREOP CHECKLIST - NOTHING BY MOUTH SINCE
3RD ATTEMPT  Called pt to go over ins benefits  Got her voicemail left a message for pt to call me back
28-Jul-2022 00:00

## 2024-03-02 ENCOUNTER — EMERGENCY (EMERGENCY)
Facility: HOSPITAL | Age: 54
LOS: 0 days | Discharge: HOME | End: 2024-03-02
Attending: EMERGENCY MEDICINE
Payer: MEDICAID

## 2024-03-02 VITALS
WEIGHT: 139.99 LBS | HEIGHT: 62 IN | RESPIRATION RATE: 18 BRPM | SYSTOLIC BLOOD PRESSURE: 175 MMHG | TEMPERATURE: 98 F | HEART RATE: 89 BPM | OXYGEN SATURATION: 99 % | DIASTOLIC BLOOD PRESSURE: 100 MMHG

## 2024-03-02 VITALS
TEMPERATURE: 97 F | HEART RATE: 88 BPM | DIASTOLIC BLOOD PRESSURE: 94 MMHG | SYSTOLIC BLOOD PRESSURE: 162 MMHG | RESPIRATION RATE: 20 BRPM | OXYGEN SATURATION: 100 %

## 2024-03-02 DIAGNOSIS — Z79.82 LONG TERM (CURRENT) USE OF ASPIRIN: ICD-10-CM

## 2024-03-02 DIAGNOSIS — Z90.710 ACQUIRED ABSENCE OF BOTH CERVIX AND UTERUS: Chronic | ICD-10-CM

## 2024-03-02 DIAGNOSIS — F41.9 ANXIETY DISORDER, UNSPECIFIED: ICD-10-CM

## 2024-03-02 DIAGNOSIS — I10 ESSENTIAL (PRIMARY) HYPERTENSION: ICD-10-CM

## 2024-03-02 DIAGNOSIS — R51.9 HEADACHE, UNSPECIFIED: ICD-10-CM

## 2024-03-02 DIAGNOSIS — F17.210 NICOTINE DEPENDENCE, CIGARETTES, UNCOMPLICATED: ICD-10-CM

## 2024-03-02 PROCEDURE — 99282 EMERGENCY DEPT VISIT SF MDM: CPT

## 2024-03-02 PROCEDURE — 99283 EMERGENCY DEPT VISIT LOW MDM: CPT

## 2024-03-02 NOTE — ED PROVIDER NOTE - CARE PROVIDER_API CALL
Gonzalo Talbot  Internal Medicine  [address] [place] [address]  Phone: [phone]  Fax: [phone]  Follow Up Time:

## 2024-03-02 NOTE — ED PROVIDER NOTE - PROGRESS NOTE DETAILS
MS–patient reports that she is asymptomatic outside of mild headache.  Discussed that her hypertension needs to be monitored and addressed by her primary care physician in case did not like to start her on any medications.  Patient understands and agrees.  Patient would like to be discharged at this time.

## 2024-03-02 NOTE — ED PROVIDER NOTE - OBJECTIVE STATEMENT
Patient is a 53-year-old female past medical history of anxiety presenting for evaluation of elevated blood pressure today.  Patient states that she noted her blood pressure was 160/100.  Patient states she has slight frontal headache.  Patient took baby aspirin the morning came to the ED for evaluation.  She denies any focal numbness or tingling, weakness, chest pain, shortness of breath, dizziness, lightheadedness.

## 2024-03-02 NOTE — ED ADULT NURSE NOTE - NSFALLRISKINTERV_ED_ALL_ED

## 2024-03-02 NOTE — ED PROVIDER NOTE - CLINICAL SUMMARY MEDICAL DECISION MAKING FREE TEXT BOX
Patient presented with elevated blood pressure reading at home as documented.  No other active complaints aside from a slight frontal headache which has since resolved status post taking aspirin this morning.  Currently asymptomatic.  Otherwise on arrival, patient afebrile, hemodynamic stable, fully neurovascularly intact.  Exam otherwise unremarkable.  Explained to patient the importance of outpatient follow-up with her PMD for long-term blood pressure control patient verbalized understanding.  Given the above, will discharge home with outpatient follow up. Patient agreeable with plan. Agrees to return to ED for any new or worsening symptoms.

## 2024-03-02 NOTE — ED ADULT NURSE NOTE - NS ED NURSE DISCH DISPOSITION
Is taking Keflex 4 times daily, Monodox twice daily, Tylenol #3 as needed. About 3-4 times every evening he is soaking his finger in water with epsom salt. The redness and swelling to his finger looks a little better in the morning but it gets bigger, more red, and more hot as the day goes on. Sometimes he doesn't have any pain. If he bumps it, he rates his pain 10/10. The finger is still very tender and sore, thinks the pain is a little better. Tylenol #3 doesn't help. Redness is not spreading. Denies fever. He called today because he's wondering if he should get IV antibiotics because this is what he needed in the past when he had cellulitis?                         Discharged

## 2024-03-02 NOTE — ED PROVIDER NOTE - PATIENT PORTAL LINK FT
You can access the FollowMyHealth Patient Portal offered by [facility] by registering at the following website: [email or web address]. By joining [employer]’s FollowMyHealth portal, you will also be able to view your health information using other applications (apps) compatible with our system.

## 2024-03-02 NOTE — ED PROVIDER NOTE - PHYSICAL EXAMINATION
VITAL SIGNS: I have reviewed nursing notes and confirm.  CONSTITUTIONAL: well-appearing, non-toxic, NAD  SKIN: Warm dry, normal skin turgor  HEAD: NCAT  EYES: EOMI, PERRLA, no scleral icterus  ENT: Moist mucous membranes, normal pharynx with no erythema or exudates  NECK: Supple; non tender. Full ROM.   CARD: RRR, no murmurs, rubs or gallops  RESP: clear to ausculation b/l.  No rales, rhonchi, or wheezing.  ABD: soft, + BS, non-tender, non-distended, no rebound or guarding.   EXT: Full ROM, no pedal edema  NEURO: normal motor. normal sensory. CN II-XII intact. No facial asymmetry. No pronator drift. Cerebellar testing normal. Normal gait.  PSYCH: Cooperative, appropriate.

## 2024-04-11 ENCOUNTER — EMERGENCY (EMERGENCY)
Facility: HOSPITAL | Age: 54
LOS: 0 days | Discharge: HOME | End: 2024-04-11
Attending: EMERGENCY MEDICINE
Payer: MEDICAID

## 2024-04-11 VITALS
HEART RATE: 60 BPM | WEIGHT: 138.01 LBS | RESPIRATION RATE: 20 BRPM | DIASTOLIC BLOOD PRESSURE: 89 MMHG | SYSTOLIC BLOOD PRESSURE: 137 MMHG | HEIGHT: 62 IN | TEMPERATURE: 98 F | OXYGEN SATURATION: 98 %

## 2024-04-11 VITALS — HEART RATE: 72 BPM | DIASTOLIC BLOOD PRESSURE: 86 MMHG | SYSTOLIC BLOOD PRESSURE: 148 MMHG

## 2024-04-11 DIAGNOSIS — M54.50 LOW BACK PAIN, UNSPECIFIED: ICD-10-CM

## 2024-04-11 DIAGNOSIS — R10.9 UNSPECIFIED ABDOMINAL PAIN: ICD-10-CM

## 2024-04-11 DIAGNOSIS — Z87.39 PERSONAL HISTORY OF OTHER DISEASES OF THE MUSCULOSKELETAL SYSTEM AND CONNECTIVE TISSUE: ICD-10-CM

## 2024-04-11 DIAGNOSIS — Z90.710 ACQUIRED ABSENCE OF BOTH CERVIX AND UTERUS: Chronic | ICD-10-CM

## 2024-04-11 DIAGNOSIS — M54.9 DORSALGIA, UNSPECIFIED: ICD-10-CM

## 2024-04-11 DIAGNOSIS — G89.29 OTHER CHRONIC PAIN: ICD-10-CM

## 2024-04-11 DIAGNOSIS — R74.8 ABNORMAL LEVELS OF OTHER SERUM ENZYMES: ICD-10-CM

## 2024-04-11 LAB
ALBUMIN SERPL ELPH-MCNC: 4.8 G/DL — SIGNIFICANT CHANGE UP (ref 3.5–5.2)
ALP SERPL-CCNC: 76 U/L — SIGNIFICANT CHANGE UP (ref 30–115)
ALT FLD-CCNC: 10 U/L — SIGNIFICANT CHANGE UP (ref 0–41)
ANION GAP SERPL CALC-SCNC: 9 MMOL/L — SIGNIFICANT CHANGE UP (ref 7–14)
APPEARANCE UR: CLEAR — SIGNIFICANT CHANGE UP
AST SERPL-CCNC: 13 U/L — SIGNIFICANT CHANGE UP (ref 0–41)
BASOPHILS # BLD AUTO: 0.04 K/UL — SIGNIFICANT CHANGE UP (ref 0–0.2)
BASOPHILS NFR BLD AUTO: 0.5 % — SIGNIFICANT CHANGE UP (ref 0–1)
BILIRUB SERPL-MCNC: <0.2 MG/DL — SIGNIFICANT CHANGE UP (ref 0.2–1.2)
BILIRUB UR-MCNC: NEGATIVE — SIGNIFICANT CHANGE UP
BUN SERPL-MCNC: 8 MG/DL — LOW (ref 10–20)
CALCIUM SERPL-MCNC: 9.5 MG/DL — SIGNIFICANT CHANGE UP (ref 8.4–10.5)
CHLORIDE SERPL-SCNC: 107 MMOL/L — SIGNIFICANT CHANGE UP (ref 98–110)
CO2 SERPL-SCNC: 26 MMOL/L — SIGNIFICANT CHANGE UP (ref 17–32)
COLOR SPEC: YELLOW — SIGNIFICANT CHANGE UP
CREAT SERPL-MCNC: 0.8 MG/DL — SIGNIFICANT CHANGE UP (ref 0.7–1.5)
DIFF PNL FLD: NEGATIVE — SIGNIFICANT CHANGE UP
EGFR: 86 ML/MIN/1.73M2 — SIGNIFICANT CHANGE UP
EOSINOPHIL # BLD AUTO: 0.23 K/UL — SIGNIFICANT CHANGE UP (ref 0–0.7)
EOSINOPHIL NFR BLD AUTO: 3 % — SIGNIFICANT CHANGE UP (ref 0–8)
GLUCOSE SERPL-MCNC: 92 MG/DL — SIGNIFICANT CHANGE UP (ref 70–99)
GLUCOSE UR QL: NEGATIVE MG/DL — SIGNIFICANT CHANGE UP
HCT VFR BLD CALC: 42 % — SIGNIFICANT CHANGE UP (ref 37–47)
HGB BLD-MCNC: 14.5 G/DL — SIGNIFICANT CHANGE UP (ref 12–16)
IMM GRANULOCYTES NFR BLD AUTO: 0.1 % — SIGNIFICANT CHANGE UP (ref 0.1–0.3)
KETONES UR-MCNC: NEGATIVE MG/DL — SIGNIFICANT CHANGE UP
LACTATE SERPL-SCNC: 0.8 MMOL/L — SIGNIFICANT CHANGE UP (ref 0.7–2)
LEUKOCYTE ESTERASE UR-ACNC: NEGATIVE — SIGNIFICANT CHANGE UP
LIDOCAIN IGE QN: 157 U/L — HIGH (ref 7–60)
LYMPHOCYTES # BLD AUTO: 2.82 K/UL — SIGNIFICANT CHANGE UP (ref 1.2–3.4)
LYMPHOCYTES # BLD AUTO: 37.2 % — SIGNIFICANT CHANGE UP (ref 20.5–51.1)
MCHC RBC-ENTMCNC: 33 PG — HIGH (ref 27–31)
MCHC RBC-ENTMCNC: 34.5 G/DL — SIGNIFICANT CHANGE UP (ref 32–37)
MCV RBC AUTO: 95.7 FL — SIGNIFICANT CHANGE UP (ref 81–99)
MONOCYTES # BLD AUTO: 0.56 K/UL — SIGNIFICANT CHANGE UP (ref 0.1–0.6)
MONOCYTES NFR BLD AUTO: 7.4 % — SIGNIFICANT CHANGE UP (ref 1.7–9.3)
NEUTROPHILS # BLD AUTO: 3.92 K/UL — SIGNIFICANT CHANGE UP (ref 1.4–6.5)
NEUTROPHILS NFR BLD AUTO: 51.8 % — SIGNIFICANT CHANGE UP (ref 42.2–75.2)
NITRITE UR-MCNC: NEGATIVE — SIGNIFICANT CHANGE UP
NRBC # BLD: 0 /100 WBCS — SIGNIFICANT CHANGE UP (ref 0–0)
PH UR: 6 — SIGNIFICANT CHANGE UP (ref 5–8)
PLATELET # BLD AUTO: 338 K/UL — SIGNIFICANT CHANGE UP (ref 130–400)
PMV BLD: 8.9 FL — SIGNIFICANT CHANGE UP (ref 7.4–10.4)
POTASSIUM SERPL-MCNC: 4.3 MMOL/L — SIGNIFICANT CHANGE UP (ref 3.5–5)
POTASSIUM SERPL-SCNC: 4.3 MMOL/L — SIGNIFICANT CHANGE UP (ref 3.5–5)
PROT SERPL-MCNC: 6.5 G/DL — SIGNIFICANT CHANGE UP (ref 6–8)
PROT UR-MCNC: NEGATIVE MG/DL — SIGNIFICANT CHANGE UP
RBC # BLD: 4.39 M/UL — SIGNIFICANT CHANGE UP (ref 4.2–5.4)
RBC # FLD: 12.5 % — SIGNIFICANT CHANGE UP (ref 11.5–14.5)
SODIUM SERPL-SCNC: 142 MMOL/L — SIGNIFICANT CHANGE UP (ref 135–146)
SP GR SPEC: >1.03 — HIGH (ref 1–1.03)
UROBILINOGEN FLD QL: 0.2 MG/DL — SIGNIFICANT CHANGE UP (ref 0.2–1)
WBC # BLD: 7.58 K/UL — SIGNIFICANT CHANGE UP (ref 4.8–10.8)
WBC # FLD AUTO: 7.58 K/UL — SIGNIFICANT CHANGE UP (ref 4.8–10.8)

## 2024-04-11 PROCEDURE — 83605 ASSAY OF LACTIC ACID: CPT

## 2024-04-11 PROCEDURE — 36415 COLL VENOUS BLD VENIPUNCTURE: CPT

## 2024-04-11 PROCEDURE — 99285 EMERGENCY DEPT VISIT HI MDM: CPT

## 2024-04-11 PROCEDURE — 74177 CT ABD & PELVIS W/CONTRAST: CPT | Mod: MC

## 2024-04-11 PROCEDURE — 74177 CT ABD & PELVIS W/CONTRAST: CPT | Mod: 26,MC

## 2024-04-11 PROCEDURE — 81003 URINALYSIS AUTO W/O SCOPE: CPT

## 2024-04-11 PROCEDURE — 99284 EMERGENCY DEPT VISIT MOD MDM: CPT | Mod: 25

## 2024-04-11 PROCEDURE — 83690 ASSAY OF LIPASE: CPT

## 2024-04-11 PROCEDURE — 96375 TX/PRO/DX INJ NEW DRUG ADDON: CPT

## 2024-04-11 PROCEDURE — 80053 COMPREHEN METABOLIC PANEL: CPT

## 2024-04-11 PROCEDURE — 85025 COMPLETE CBC W/AUTO DIFF WBC: CPT

## 2024-04-11 PROCEDURE — 87086 URINE CULTURE/COLONY COUNT: CPT

## 2024-04-11 PROCEDURE — 96374 THER/PROPH/DIAG INJ IV PUSH: CPT | Mod: XU

## 2024-04-11 RX ORDER — KETOROLAC TROMETHAMINE 30 MG/ML
15 SYRINGE (ML) INJECTION ONCE
Refills: 0 | Status: DISCONTINUED | OUTPATIENT
Start: 2024-04-11 | End: 2024-04-11

## 2024-04-11 RX ORDER — SODIUM CHLORIDE 9 MG/ML
1000 INJECTION, SOLUTION INTRAVENOUS ONCE
Refills: 0 | Status: COMPLETED | OUTPATIENT
Start: 2024-04-11 | End: 2024-04-11

## 2024-04-11 RX ORDER — MORPHINE SULFATE 50 MG/1
6 CAPSULE, EXTENDED RELEASE ORAL ONCE
Refills: 0 | Status: DISCONTINUED | OUTPATIENT
Start: 2024-04-11 | End: 2024-04-11

## 2024-04-11 RX ADMIN — SODIUM CHLORIDE 1000 MILLILITER(S): 9 INJECTION, SOLUTION INTRAVENOUS at 14:55

## 2024-04-11 RX ADMIN — Medication 15 MILLIGRAM(S): at 14:55

## 2024-04-11 RX ADMIN — MORPHINE SULFATE 6 MILLIGRAM(S): 50 CAPSULE, EXTENDED RELEASE ORAL at 15:47

## 2024-04-11 NOTE — ED PROVIDER NOTE - PATIENT PORTAL LINK FT
You can access the FollowMyHealth Patient Portal offered by [facility] by registering at the following website: [email or web address]. By joining [employer]s FollowMyHealth portal, you will also be able to view your health information using other applications (apps) compatible with our system.

## 2024-04-11 NOTE — ED PROVIDER NOTE - ATTENDING APP SHARED VISIT CONTRIBUTION OF CARE
patient with chronic back pain presents for eval experiencing atypical pain concerned for alternate pathology, exam as above, labs and studies reviewed, will d/c to f/u with her pain management doctor. Patient counseled regarding conditions which should prompt return.

## 2024-04-11 NOTE — ED PROVIDER NOTE - OBJECTIVE STATEMENT
patient c/o left sided back and flank pain, Patient with h/o low back disk dz, Seen by her pain management yesterday , told to have kidney eval

## 2024-04-11 NOTE — ED ADULT NURSE NOTE - NSFALLRISKINTERV_ED_ALL_ED

## 2024-04-11 NOTE — ED PROVIDER NOTE - CARE PROVIDER_API CALL
Jeremiah Bower  Gastroenterology  [address] [place] [address]  Phone: [phone]  Fax: [phone]  Follow Up Time:

## 2024-04-12 LAB
CULTURE RESULTS: SIGNIFICANT CHANGE UP
SPECIMEN SOURCE: SIGNIFICANT CHANGE UP

## 2024-09-20 ENCOUNTER — NON-APPOINTMENT (OUTPATIENT)
Age: 54
End: 2024-09-20

## 2025-01-15 NOTE — ED PROVIDER NOTE - DISPOSITION TYPE
Previous Labs: No How Did The Hair Loss Occur?: gradual in onset How Severe Is Your Hair Loss?: moderate DISCHARGE

## 2025-05-14 NOTE — PATIENT PROFILE ADULT - NSPROGENBLOODRESTRICT_GEN_A_NUR
Educated re: the health risks associated with the use of nicotine/tobacco products, and counseled on the importance of cessation. Discussed options for nicotine replacement and/or pharmacotherapies to aid in cessation (e.g. varenicline, bupropion). Not currently interested in cessation assistance.     none

## 2025-07-03 ENCOUNTER — APPOINTMENT (OUTPATIENT)
Dept: PULMONOLOGY | Facility: CLINIC | Age: 55
End: 2025-07-03
Payer: MEDICAID

## 2025-07-03 VITALS
OXYGEN SATURATION: 99 % | BODY MASS INDEX: 25.03 KG/M2 | DIASTOLIC BLOOD PRESSURE: 80 MMHG | SYSTOLIC BLOOD PRESSURE: 120 MMHG | WEIGHT: 136 LBS | HEIGHT: 62 IN | HEART RATE: 71 BPM | RESPIRATION RATE: 12 BRPM

## 2025-07-03 PROBLEM — R09.82 POST-NASAL DRIP: Status: ACTIVE | Noted: 2025-07-03

## 2025-07-03 PROBLEM — J44.9 CHRONIC OBSTRUCTIVE PULMONARY DISEASE, UNSPECIFIED COPD TYPE: Status: ACTIVE | Noted: 2025-07-03

## 2025-07-03 PROBLEM — Z86.59 HISTORY OF ANXIETY: Status: RESOLVED | Noted: 2025-07-03 | Resolved: 2025-07-03

## 2025-07-03 PROBLEM — F17.200 CURRENT SMOKER: Status: ACTIVE | Noted: 2025-07-03

## 2025-07-03 PROBLEM — R93.89 ABNORMAL CHEST CT: Status: ACTIVE | Noted: 2025-07-03

## 2025-07-03 PROBLEM — Z12.2 ENCOUNTER FOR SCREENING FOR LUNG CANCER: Status: ACTIVE | Noted: 2025-07-03

## 2025-07-03 PROBLEM — Z82.49 FAMILY HISTORY OF HYPERTENSION: Status: ACTIVE | Noted: 2025-07-03

## 2025-07-03 PROBLEM — F17.210 SMOKES CIGARETTES: Status: ACTIVE | Noted: 2025-07-03

## 2025-07-03 PROCEDURE — 99204 OFFICE O/P NEW MOD 45 MIN: CPT

## 2025-07-03 PROCEDURE — G2211 COMPLEX E/M VISIT ADD ON: CPT | Mod: NC

## 2025-07-03 RX ORDER — FLUTICASONE FUROATE AND VILANTEROL TRIFENATATE 100; 25 UG/1; UG/1
100-25 POWDER RESPIRATORY (INHALATION)
Qty: 1 | Refills: 3 | Status: ACTIVE | COMMUNITY
Start: 2025-07-03 | End: 1900-01-01

## 2025-07-03 RX ORDER — AZELASTINE HYDROCHLORIDE 137 UG/1
0.1 SPRAY, METERED NASAL
Qty: 1 | Refills: 3 | Status: ACTIVE | COMMUNITY
Start: 2025-07-03 | End: 1900-01-01

## 2025-07-07 RX ORDER — FLUTICASONE PROPIONATE AND SALMETEROL 250; 50 UG/1; UG/1
250-50 POWDER RESPIRATORY (INHALATION)
Qty: 60 | Refills: 0 | Status: ACTIVE | COMMUNITY
Start: 2025-07-07 | End: 1900-01-01

## 2025-07-07 RX ORDER — BUDESONIDE AND FORMOTEROL FUMARATE DIHYDRATE 80; 4.5 UG/1; UG/1
80-4.5 AEROSOL RESPIRATORY (INHALATION)
Qty: 1 | Refills: 3 | Status: ACTIVE | COMMUNITY
Start: 2025-07-07 | End: 1900-01-01

## 2025-07-15 ENCOUNTER — EMERGENCY (EMERGENCY)
Facility: HOSPITAL | Age: 55
LOS: 0 days | Discharge: HOME | End: 2025-07-16
Attending: STUDENT IN AN ORGANIZED HEALTH CARE EDUCATION/TRAINING PROGRAM
Payer: MEDICAID

## 2025-07-15 VITALS
SYSTOLIC BLOOD PRESSURE: 150 MMHG | DIASTOLIC BLOOD PRESSURE: 107 MMHG | HEART RATE: 92 BPM | WEIGHT: 137.13 LBS | RESPIRATION RATE: 18 BRPM | OXYGEN SATURATION: 99 %

## 2025-07-15 DIAGNOSIS — Z82.49 FAMILY HISTORY OF ISCHEMIC HEART DISEASE AND OTHER DISEASES OF THE CIRCULATORY SYSTEM: ICD-10-CM

## 2025-07-15 DIAGNOSIS — R07.2 PRECORDIAL PAIN: ICD-10-CM

## 2025-07-15 DIAGNOSIS — R06.02 SHORTNESS OF BREATH: ICD-10-CM

## 2025-07-15 DIAGNOSIS — J98.11 ATELECTASIS: ICD-10-CM

## 2025-07-15 DIAGNOSIS — I25.10 ATHEROSCLEROTIC HEART DISEASE OF NATIVE CORONARY ARTERY WITHOUT ANGINA PECTORIS: ICD-10-CM

## 2025-07-15 DIAGNOSIS — R07.89 OTHER CHEST PAIN: ICD-10-CM

## 2025-07-15 DIAGNOSIS — F17.200 NICOTINE DEPENDENCE, UNSPECIFIED, UNCOMPLICATED: ICD-10-CM

## 2025-07-15 DIAGNOSIS — F41.9 ANXIETY DISORDER, UNSPECIFIED: ICD-10-CM

## 2025-07-15 DIAGNOSIS — Z90.710 ACQUIRED ABSENCE OF BOTH CERVIX AND UTERUS: Chronic | ICD-10-CM

## 2025-07-15 LAB
ALBUMIN SERPL ELPH-MCNC: 4.6 G/DL — SIGNIFICANT CHANGE UP (ref 3.5–5.2)
ALP SERPL-CCNC: 84 U/L — SIGNIFICANT CHANGE UP (ref 30–115)
ALT FLD-CCNC: 16 U/L — SIGNIFICANT CHANGE UP (ref 0–41)
ANION GAP SERPL CALC-SCNC: 10 MMOL/L — SIGNIFICANT CHANGE UP (ref 7–14)
AST SERPL-CCNC: 16 U/L — SIGNIFICANT CHANGE UP (ref 0–41)
BASOPHILS # BLD AUTO: 0.03 K/UL — SIGNIFICANT CHANGE UP (ref 0–0.2)
BASOPHILS NFR BLD AUTO: 0.4 % — SIGNIFICANT CHANGE UP (ref 0–1)
BILIRUB SERPL-MCNC: 0.7 MG/DL — SIGNIFICANT CHANGE UP (ref 0.2–1.2)
BUN SERPL-MCNC: 10 MG/DL — SIGNIFICANT CHANGE UP (ref 10–20)
CALCIUM SERPL-MCNC: 9.6 MG/DL — SIGNIFICANT CHANGE UP (ref 8.4–10.5)
CHLORIDE SERPL-SCNC: 106 MMOL/L — SIGNIFICANT CHANGE UP (ref 98–110)
CO2 SERPL-SCNC: 25 MMOL/L — SIGNIFICANT CHANGE UP (ref 17–32)
CREAT SERPL-MCNC: 0.8 MG/DL — SIGNIFICANT CHANGE UP (ref 0.7–1.5)
D DIMER BLD IA.RAPID-MCNC: <150 NG/ML DDU — SIGNIFICANT CHANGE UP
EGFR: 86 ML/MIN/1.73M2 — SIGNIFICANT CHANGE UP
EGFR: 86 ML/MIN/1.73M2 — SIGNIFICANT CHANGE UP
EOSINOPHIL # BLD AUTO: 0.23 K/UL — SIGNIFICANT CHANGE UP (ref 0–0.7)
EOSINOPHIL NFR BLD AUTO: 2.9 % — SIGNIFICANT CHANGE UP (ref 0–8)
GLUCOSE SERPL-MCNC: 95 MG/DL — SIGNIFICANT CHANGE UP (ref 70–99)
HCG SERPL QL: NEGATIVE — SIGNIFICANT CHANGE UP
HCT VFR BLD CALC: 43 % — SIGNIFICANT CHANGE UP (ref 37–47)
HGB BLD-MCNC: 14.6 G/DL — SIGNIFICANT CHANGE UP (ref 12–16)
IMM GRANULOCYTES NFR BLD AUTO: 0.2 % — SIGNIFICANT CHANGE UP (ref 0.1–0.3)
LIDOCAIN IGE QN: 40 U/L — SIGNIFICANT CHANGE UP (ref 7–60)
LYMPHOCYTES # BLD AUTO: 2.75 K/UL — SIGNIFICANT CHANGE UP (ref 1.2–3.4)
LYMPHOCYTES # BLD AUTO: 34.2 % — SIGNIFICANT CHANGE UP (ref 20.5–51.1)
MCHC RBC-ENTMCNC: 33.4 PG — HIGH (ref 27–31)
MCHC RBC-ENTMCNC: 34 G/DL — SIGNIFICANT CHANGE UP (ref 32–37)
MCV RBC AUTO: 98.4 FL — SIGNIFICANT CHANGE UP (ref 81–99)
MONOCYTES # BLD AUTO: 0.49 K/UL — SIGNIFICANT CHANGE UP (ref 0.1–0.6)
MONOCYTES NFR BLD AUTO: 6.1 % — SIGNIFICANT CHANGE UP (ref 1.7–9.3)
NEUTROPHILS # BLD AUTO: 4.51 K/UL — SIGNIFICANT CHANGE UP (ref 1.4–6.5)
NEUTROPHILS NFR BLD AUTO: 56.2 % — SIGNIFICANT CHANGE UP (ref 42.2–75.2)
NRBC BLD AUTO-RTO: 0 /100 WBCS — SIGNIFICANT CHANGE UP (ref 0–0)
NT-PROBNP SERPL-SCNC: 42 PG/ML — SIGNIFICANT CHANGE UP (ref 0–300)
PLATELET # BLD AUTO: 322 K/UL — SIGNIFICANT CHANGE UP (ref 130–400)
PMV BLD: 9.5 FL — SIGNIFICANT CHANGE UP (ref 7.4–10.4)
POTASSIUM SERPL-MCNC: 4.6 MMOL/L — SIGNIFICANT CHANGE UP (ref 3.5–5)
POTASSIUM SERPL-SCNC: 4.6 MMOL/L — SIGNIFICANT CHANGE UP (ref 3.5–5)
PROT SERPL-MCNC: 6.5 G/DL — SIGNIFICANT CHANGE UP (ref 6–8)
RBC # BLD: 4.37 M/UL — SIGNIFICANT CHANGE UP (ref 4.2–5.4)
RBC # FLD: 13.1 % — SIGNIFICANT CHANGE UP (ref 11.5–14.5)
SODIUM SERPL-SCNC: 141 MMOL/L — SIGNIFICANT CHANGE UP (ref 135–146)
TROPONIN T, HIGH SENSITIVITY RESULT: 6 NG/L — SIGNIFICANT CHANGE UP (ref 6–13)
TROPONIN T, HIGH SENSITIVITY RESULT: <6 NG/L — SIGNIFICANT CHANGE UP (ref 6–13)
WBC # BLD: 8.03 K/UL — SIGNIFICANT CHANGE UP (ref 4.8–10.8)
WBC # FLD AUTO: 8.03 K/UL — SIGNIFICANT CHANGE UP (ref 4.8–10.8)

## 2025-07-15 PROCEDURE — 80053 COMPREHEN METABOLIC PANEL: CPT

## 2025-07-15 PROCEDURE — 83880 ASSAY OF NATRIURETIC PEPTIDE: CPT

## 2025-07-15 PROCEDURE — 36415 COLL VENOUS BLD VENIPUNCTURE: CPT

## 2025-07-15 PROCEDURE — 99223 1ST HOSP IP/OBS HIGH 75: CPT

## 2025-07-15 PROCEDURE — 75574 CT ANGIO HRT W/3D IMAGE: CPT

## 2025-07-15 PROCEDURE — 84484 ASSAY OF TROPONIN QUANT: CPT

## 2025-07-15 PROCEDURE — 83690 ASSAY OF LIPASE: CPT

## 2025-07-15 PROCEDURE — 85025 COMPLETE CBC W/AUTO DIFF WBC: CPT

## 2025-07-15 PROCEDURE — 71045 X-RAY EXAM CHEST 1 VIEW: CPT

## 2025-07-15 PROCEDURE — 84703 CHORIONIC GONADOTROPIN ASSAY: CPT

## 2025-07-15 PROCEDURE — 71045 X-RAY EXAM CHEST 1 VIEW: CPT | Mod: 26

## 2025-07-15 PROCEDURE — G0378: CPT

## 2025-07-15 PROCEDURE — 85379 FIBRIN DEGRADATION QUANT: CPT

## 2025-07-15 PROCEDURE — 93005 ELECTROCARDIOGRAM TRACING: CPT

## 2025-07-15 PROCEDURE — 93010 ELECTROCARDIOGRAM REPORT: CPT

## 2025-07-15 PROCEDURE — 99285 EMERGENCY DEPT VISIT HI MDM: CPT | Mod: 25

## 2025-07-15 RX ORDER — ALPRAZOLAM 0.5 MG
2 TABLET, EXTENDED RELEASE 24 HR ORAL ONCE
Refills: 0 | Status: DISCONTINUED | OUTPATIENT
Start: 2025-07-15 | End: 2025-07-15

## 2025-07-15 RX ORDER — ASPIRIN 325 MG
324 TABLET ORAL ONCE
Refills: 0 | Status: COMPLETED | OUTPATIENT
Start: 2025-07-15 | End: 2025-07-15

## 2025-07-15 RX ADMIN — Medication 324 MILLIGRAM(S): at 17:50

## 2025-07-15 RX ADMIN — Medication 2 MILLIGRAM(S): at 20:55

## 2025-07-15 NOTE — ED PROVIDER NOTE - OBJECTIVE STATEMENT
Pt is a 54yo F w/ PMHx anxiety and carotid artery plaque, presenting to the ED with 2 days of chest pain radiating to the back, chest pressure and SOB. when pain started the pain was radiating to her left arm, but now that has resolved, reports pain reduced but continues to have chest pressure and mild pain radiating to the back. Pt reports she was recently told she has coronary artery plaque. Pt took her xanax for anxiety which helped with anxiety but chest pressure remained. Pt is scheduled to see her cardiologist to do a CCTA. Denies fevers, chills, dizziness.

## 2025-07-15 NOTE — ED CDU PROVIDER INITIAL DAY NOTE - CLINICAL SUMMARY MEDICAL DECISION MAKING FREE TEXT BOX
56 yo F presented to ED for eval of CP. Labs and EKG were ordered and reviewed. EKG NSR. Trop <6. D-dimer negative. Imaging was ordered and reviewed by me.  Appropriate medications for patient's presenting complaints were ordered and effects were reassessed.  Patient's records (prior hospital, ED visit, and/or nursing home notes if available) were reviewed.  Additional history was obtained from EMS, family, and/or PCP (where available).  Escalation to admission/observation was considered. Placed in ED OBS for further cardiac testing.

## 2025-07-15 NOTE — ED PROVIDER NOTE - DIFFERENTIAL DIAGNOSIS
The differential diagnosis for patients clinical presentation includes but is not limited to: acs, anxiety, ptx ,pna Differential Diagnosis

## 2025-07-15 NOTE — ED ADULT NURSE NOTE - DISCHARGE DATE/TIME
Anyjennifer Howe   MRN: [id number]    Department:  [facility] Emergency Department   Date of Visit:  1/24/2018           Disclosure     Insurance plans vary and the physician(s) referred by the ER may not be covered by your plan.  Please contact your tell this physician (or your personal doctor if your instructions are to return to your personal doctor) about any new or lasting problems. The primary care or specialist physician will see patients referred from the [facility] Emergency Department.  Mirtha Caba 16-Jul-2025 10:49

## 2025-07-15 NOTE — ED PROVIDER NOTE - ATTENDING CONTRIBUTION TO CARE
56 yo F PMHX anxiety presents to ED for eval of chest pain for two days described as chest pressure, substernal, radiating to L arm, intermittent. Pt reports sx worsen on exertion. Reports she had elevated calcium score with Cardiologist Dr. Ontiveros, is supposed to get out patient CCTA. Pt denies fever, cough, nausea, vomiting.    CONSTITUTIONAL: NAD.   SKIN: warm, dry  HEAD: Normocephalic; atraumatic.   ENT: MMM.   NECK: Supple.  CARD: RRR.   RESP: No wheezes, rales or rhonchi.  ABD: soft ntnd.   EXT: Normal ROM.  No lower extremity edema.   NEURO: Alert, oriented, grossly unremarkable.

## 2025-07-15 NOTE — ED PROVIDER NOTE - CLINICAL SUMMARY MEDICAL DECISION MAKING FREE TEXT BOX
54 yo F presented to ED for eval of CP. Labs and EKG were ordered and reviewed. EKG NSR. Trop <6. D-dimer negative. Imaging was ordered and reviewed by me.  Appropriate medications for patient's presenting complaints were ordered and effects were reassessed.  Patient's records (prior hospital, ED visit, and/or nursing home notes if available) were reviewed.  Additional history was obtained from EMS, family, and/or PCP (where available).  Escalation to admission/observation was considered. Placed in ED OBS for further cardiac testing.

## 2025-07-15 NOTE — ED PROVIDER NOTE - PHYSICAL EXAMINATION
VITAL SIGNS: I have reviewed nursing notes and confirm.  CONSTITUTIONAL: Well-developed; well-nourished; in no acute distress.  SKIN: Skin exam is warm and dry, no acute rash.  HEAD: Normocephalic .  EYES: conjunctiva and sclera clear.  ENT: airway clear.   CARD: S1, S2 normal; Regular rate and rhythm. pulses 2+ in all extremities.  RESP: Normal respiratory effort, no tachypnea or distress. Lungs CTAB, no wheezes, rales or rhonchi.  ABD: soft, NT/ND.  EXT: No cyanosis edema all extremities.  NEURO: Alert, oriented.  PSYCH: Cooperative, appropriate.

## 2025-07-16 VITALS
OXYGEN SATURATION: 100 % | SYSTOLIC BLOOD PRESSURE: 135 MMHG | HEART RATE: 68 BPM | DIASTOLIC BLOOD PRESSURE: 90 MMHG | RESPIRATION RATE: 18 BRPM

## 2025-07-16 PROCEDURE — 75574 CT ANGIO HRT W/3D IMAGE: CPT | Mod: 26

## 2025-07-16 PROCEDURE — 99239 HOSP IP/OBS DSCHRG MGMT >30: CPT

## 2025-07-16 RX ORDER — ATORVASTATIN CALCIUM 80 MG/1
1 TABLET, FILM COATED ORAL
Qty: 30 | Refills: 0
Start: 2025-07-16 | End: 2025-08-14

## 2025-07-16 RX ORDER — METOPROLOL SUCCINATE 50 MG/1
100 TABLET, EXTENDED RELEASE ORAL ONCE
Refills: 0 | Status: COMPLETED | OUTPATIENT
Start: 2025-07-16 | End: 2025-07-16

## 2025-07-16 RX ORDER — ASPIRIN 325 MG
1 TABLET ORAL
Qty: 30 | Refills: 0
Start: 2025-07-16 | End: 2025-08-14

## 2025-07-16 RX ADMIN — METOPROLOL SUCCINATE 100 MILLIGRAM(S): 50 TABLET, EXTENDED RELEASE ORAL at 05:20

## 2025-07-16 NOTE — ED CDU PROVIDER SUBSEQUENT DAY NOTE - OBTAINED AND REVIEWED OLD RECORDS MULTI-SELECT OPTIONS
Reports multiple recurrent episodes of vertigo associated with tinnitus (whooshing sound) which is not associated to head position or movement and not associated to cold illness.  Episodes last 1-3 minutes and happens multiple times a week.  Neurological examination today unremarkable.   -Audiology eval, would like to assess for possible hearing loss  -On the differential Ménière's, vestibulitis, would like to rule out acoustic neuroma.  History is less suggestive of BPPV at this point.  -Will get auditory MRI  -Needs ENT eval, referred   Inpatient Records

## 2025-07-16 NOTE — ED CDU PROVIDER DISPOSITION NOTE - WR ORDER ID 1
[id number]
Quality 130: Documentation Of Current Medications In The Medical Record: Current Medications Documented
Quality 431: Preventive Care And Screening: Unhealthy Alcohol Use - Screening: Patient not identified as an unhealthy alcohol user when screened for unhealthy alcohol use using a systematic screening method
Detail Level: Simple
Quality 226: Preventive Care And Screening: Tobacco Use: Screening And Cessation Intervention: Patient screened for tobacco use and is an ex/non-smoker
Quality 47: Advance Care Plan: Advance Care Planning discussed and documented; advance care plan or surrogate decision maker documented in the medical record.

## 2025-07-16 NOTE — ED CDU PROVIDER DISPOSITION NOTE - CLINICAL COURSE
Pt in obs for cp. Says she had calcium score 236 last wk and was recommended to have CCTA by her cardiologist Dr. Ontiveros. Here labs notable for negative trop x2, negative d-dimer. Ekg with NSR, no ischemic changes. CCTA with CAD RADS 2 and incidental bibasilar atelectasis. All results printed and discussed with pt and . Sent rx for ASA and statin to pharmacy. Has f/u cardio appt on 7/30. Strict ED return precautions given. Pt verbalized understanding and was agreeable with plan.

## 2025-07-16 NOTE — ED ADULT NURSE REASSESSMENT NOTE - NS ED NURSE REASSESS COMMENT FT1
Report received, care assumed. Pt aaox4, aware of pending CTA, denies any pain, no s/s distress noted. MD rounding at bedside. Fall precautions maintained.

## 2025-07-16 NOTE — ED ADULT NURSE REASSESSMENT NOTE - NS ED NURSE REASSESS COMMENT FT1
Report received, care assumed. Pt resting on her right side, on the gurney, chest rise even and unlabored, no s/s distress noted. Fall precautions maintained.

## 2025-07-16 NOTE — ED CDU PROVIDER DISPOSITION NOTE - CARE PROVIDER_API CALL
Angel Ontiveros)  Interventional Cardiology  [address] [place] [address], [address]  [place] [address]  Phone: [phone]  Fax: [phone]  Follow Up Time:

## 2025-07-16 NOTE — ED CDU PROVIDER DISPOSITION NOTE - PATIENT PORTAL LINK FT
You can access the FollowMyHealth Patient Portal offered by [facility] by registering at the following website: [email or web address]. By joining [employer]’s FollowMyHealth portal, you will also be able to view your health information using other applications (apps) compatible with our system.

## 2025-07-16 NOTE — ED CDU PROVIDER SUBSEQUENT DAY NOTE - CLINICAL SUMMARY MEDICAL DECISION MAKING FREE TEXT BOX
Received pt in obs. Here with cp. Says she had calcium score 236 last wk and was recommended to have CCTA by her cardiologist Dr. Ontiveros. Here labs notable for negative trop x2, negative d-dimer. Ekg with NSR, no ischemic changes. Pending CCTA. Has f/u cardio appt on 7/30.

## 2025-07-18 ENCOUNTER — OUTPATIENT (OUTPATIENT)
Dept: OUTPATIENT SERVICES | Facility: HOSPITAL | Age: 55
LOS: 1 days | End: 2025-07-18
Payer: MEDICAID

## 2025-07-18 DIAGNOSIS — Z90.710 ACQUIRED ABSENCE OF BOTH CERVIX AND UTERUS: Chronic | ICD-10-CM

## 2025-07-18 DIAGNOSIS — R06.02 SHORTNESS OF BREATH: ICD-10-CM

## 2025-07-18 PROCEDURE — 75580 N-INVAS EST C FFR SW ALY CTA: CPT | Mod: 26

## 2025-07-18 PROCEDURE — 75580 N-INVAS EST C FFR SW ALY CTA: CPT

## 2025-07-19 DIAGNOSIS — R06.02 SHORTNESS OF BREATH: ICD-10-CM

## 2025-07-29 ENCOUNTER — OUTPATIENT (OUTPATIENT)
Dept: OUTPATIENT SERVICES | Facility: HOSPITAL | Age: 55
LOS: 1 days | End: 2025-07-29
Payer: MEDICAID

## 2025-07-29 ENCOUNTER — APPOINTMENT (OUTPATIENT)
Dept: PULMONOLOGY | Facility: HOSPITAL | Age: 55
End: 2025-07-29

## 2025-07-29 DIAGNOSIS — Z90.710 ACQUIRED ABSENCE OF BOTH CERVIX AND UTERUS: Chronic | ICD-10-CM

## 2025-07-29 DIAGNOSIS — R06.02 SHORTNESS OF BREATH: ICD-10-CM

## 2025-07-29 PROCEDURE — 94729 DIFFUSING CAPACITY: CPT

## 2025-07-29 PROCEDURE — 94727 GAS DIL/WSHOT DETER LNG VOL: CPT

## 2025-07-29 PROCEDURE — 94664 DEMO&/EVAL PT USE INHALER: CPT

## 2025-07-29 PROCEDURE — 94070 EVALUATION OF WHEEZING: CPT

## 2025-07-30 DIAGNOSIS — R06.02 SHORTNESS OF BREATH: ICD-10-CM
